# Patient Record
Sex: MALE | Race: WHITE | NOT HISPANIC OR LATINO | Employment: FULL TIME | URBAN - METROPOLITAN AREA
[De-identification: names, ages, dates, MRNs, and addresses within clinical notes are randomized per-mention and may not be internally consistent; named-entity substitution may affect disease eponyms.]

---

## 2019-07-05 ENCOUNTER — APPOINTMENT (EMERGENCY)
Dept: RADIOLOGY | Facility: HOSPITAL | Age: 44
End: 2019-07-05
Payer: COMMERCIAL

## 2019-07-05 ENCOUNTER — HOSPITAL ENCOUNTER (EMERGENCY)
Facility: HOSPITAL | Age: 44
End: 2019-07-06
Attending: EMERGENCY MEDICINE | Admitting: EMERGENCY MEDICINE
Payer: COMMERCIAL

## 2019-07-05 DIAGNOSIS — S91.302A AVULSION OF SKIN OF LEFT FOOT, INITIAL ENCOUNTER: Primary | ICD-10-CM

## 2019-07-05 DIAGNOSIS — S91.301A AVULSION OF SKIN OF RIGHT FOOT, INITIAL ENCOUNTER: ICD-10-CM

## 2019-07-05 DIAGNOSIS — S90.851A FOREIGN BODY IN RIGHT FOOT, INITIAL ENCOUNTER: ICD-10-CM

## 2019-07-05 PROCEDURE — 90471 IMMUNIZATION ADMIN: CPT

## 2019-07-05 PROCEDURE — 99284 EMERGENCY DEPT VISIT MOD MDM: CPT

## 2019-07-05 PROCEDURE — 99284 EMERGENCY DEPT VISIT MOD MDM: CPT | Performed by: PHYSICIAN ASSISTANT

## 2019-07-05 PROCEDURE — 73630 X-RAY EXAM OF FOOT: CPT

## 2019-07-05 RX ORDER — ACETAMINOPHEN 325 MG/1
975 TABLET ORAL ONCE
Status: COMPLETED | OUTPATIENT
Start: 2019-07-05 | End: 2019-07-05

## 2019-07-05 RX ORDER — CEFAZOLIN SODIUM 2 G/50ML
2000 SOLUTION INTRAVENOUS ONCE
Status: COMPLETED | OUTPATIENT
Start: 2019-07-05 | End: 2019-07-06

## 2019-07-05 RX ORDER — OXYCODONE HYDROCHLORIDE AND ACETAMINOPHEN 5; 325 MG/1; MG/1
1 TABLET ORAL ONCE
Status: DISCONTINUED | OUTPATIENT
Start: 2019-07-05 | End: 2019-07-05

## 2019-07-05 RX ADMIN — ACETAMINOPHEN 975 MG: 325 TABLET, FILM COATED ORAL at 22:37

## 2019-07-06 ENCOUNTER — HOSPITAL ENCOUNTER (INPATIENT)
Facility: HOSPITAL | Age: 44
LOS: 2 days | Discharge: HOME HEALTH | DRG: 902 | End: 2019-07-08
Attending: PODIATRIST | Admitting: PODIATRIST
Payer: COMMERCIAL

## 2019-07-06 ENCOUNTER — APPOINTMENT (INPATIENT)
Dept: RADIOLOGY | Facility: HOSPITAL | Age: 44
DRG: 902 | End: 2019-07-06
Payer: COMMERCIAL

## 2019-07-06 ENCOUNTER — ANESTHESIA (INPATIENT)
Dept: PERIOP | Facility: HOSPITAL | Age: 44
DRG: 902 | End: 2019-07-06
Payer: COMMERCIAL

## 2019-07-06 ENCOUNTER — ANESTHESIA EVENT (INPATIENT)
Dept: PERIOP | Facility: HOSPITAL | Age: 44
DRG: 902 | End: 2019-07-06
Payer: COMMERCIAL

## 2019-07-06 VITALS
HEART RATE: 90 BPM | RESPIRATION RATE: 18 BRPM | DIASTOLIC BLOOD PRESSURE: 60 MMHG | SYSTOLIC BLOOD PRESSURE: 150 MMHG | TEMPERATURE: 98 F | OXYGEN SATURATION: 97 % | HEIGHT: 75 IN

## 2019-07-06 DIAGNOSIS — S99.929A: Primary | ICD-10-CM

## 2019-07-06 DIAGNOSIS — S99.922D: ICD-10-CM

## 2019-07-06 DIAGNOSIS — S99.921A SOFT TISSUE INJURY OF RIGHT FOOT, INITIAL ENCOUNTER: ICD-10-CM

## 2019-07-06 DIAGNOSIS — S99.922A SOFT TISSUE INJURY OF LEFT FOOT, INITIAL ENCOUNTER: ICD-10-CM

## 2019-07-06 DIAGNOSIS — N17.9 AKI (ACUTE KIDNEY INJURY) (HCC): ICD-10-CM

## 2019-07-06 PROBLEM — F32.A DEPRESSION: Status: ACTIVE | Noted: 2019-07-06

## 2019-07-06 LAB
ALBUMIN SERPL BCP-MCNC: 4.1 G/DL (ref 3.5–5)
ALP SERPL-CCNC: 109 U/L (ref 46–116)
ALT SERPL W P-5'-P-CCNC: 29 U/L (ref 12–78)
ANION GAP SERPL CALCULATED.3IONS-SCNC: 10 MMOL/L (ref 4–13)
ANION GAP SERPL CALCULATED.3IONS-SCNC: 6 MMOL/L (ref 4–13)
AST SERPL W P-5'-P-CCNC: 21 U/L (ref 5–45)
BASOPHILS # BLD AUTO: 0.03 THOUSANDS/ΜL (ref 0–0.1)
BASOPHILS # BLD AUTO: 0.04 THOUSANDS/ΜL (ref 0–0.1)
BASOPHILS NFR BLD AUTO: 0 % (ref 0–1)
BASOPHILS NFR BLD AUTO: 0 % (ref 0–1)
BILIRUB SERPL-MCNC: 0.4 MG/DL (ref 0.2–1)
BUN SERPL-MCNC: 25 MG/DL (ref 5–25)
BUN SERPL-MCNC: 29 MG/DL (ref 5–25)
CALCIUM SERPL-MCNC: 8.5 MG/DL (ref 8.3–10.1)
CALCIUM SERPL-MCNC: 8.9 MG/DL (ref 8.3–10.1)
CHLORIDE SERPL-SCNC: 102 MMOL/L (ref 100–108)
CHLORIDE SERPL-SCNC: 106 MMOL/L (ref 100–108)
CO2 SERPL-SCNC: 26 MMOL/L (ref 21–32)
CO2 SERPL-SCNC: 28 MMOL/L (ref 21–32)
CREAT SERPL-MCNC: 1.26 MG/DL (ref 0.6–1.3)
CREAT SERPL-MCNC: 1.49 MG/DL (ref 0.6–1.3)
EOSINOPHIL # BLD AUTO: 0.03 THOUSAND/ΜL (ref 0–0.61)
EOSINOPHIL # BLD AUTO: 0.06 THOUSAND/ΜL (ref 0–0.61)
EOSINOPHIL NFR BLD AUTO: 0 % (ref 0–6)
EOSINOPHIL NFR BLD AUTO: 1 % (ref 0–6)
ERYTHROCYTE [DISTWIDTH] IN BLOOD BY AUTOMATED COUNT: 12.5 % (ref 11.6–15.1)
ERYTHROCYTE [DISTWIDTH] IN BLOOD BY AUTOMATED COUNT: 12.5 % (ref 11.6–15.1)
GFR SERPL CREATININE-BSD FRML MDRD: 57 ML/MIN/1.73SQ M
GFR SERPL CREATININE-BSD FRML MDRD: 70 ML/MIN/1.73SQ M
GLUCOSE SERPL-MCNC: 102 MG/DL (ref 65–140)
GLUCOSE SERPL-MCNC: 141 MG/DL (ref 65–140)
HCT VFR BLD AUTO: 43.4 % (ref 36.5–49.3)
HCT VFR BLD AUTO: 47.2 % (ref 36.5–49.3)
HGB BLD-MCNC: 14.7 G/DL (ref 12–17)
HGB BLD-MCNC: 16.3 G/DL (ref 12–17)
IMM GRANULOCYTES # BLD AUTO: 0.03 THOUSAND/UL (ref 0–0.2)
IMM GRANULOCYTES # BLD AUTO: 0.06 THOUSAND/UL (ref 0–0.2)
IMM GRANULOCYTES NFR BLD AUTO: 0 % (ref 0–2)
IMM GRANULOCYTES NFR BLD AUTO: 1 % (ref 0–2)
LYMPHOCYTES # BLD AUTO: 0.96 THOUSANDS/ΜL (ref 0.6–4.47)
LYMPHOCYTES # BLD AUTO: 1.53 THOUSANDS/ΜL (ref 0.6–4.47)
LYMPHOCYTES NFR BLD AUTO: 16 % (ref 14–44)
LYMPHOCYTES NFR BLD AUTO: 8 % (ref 14–44)
MCH RBC QN AUTO: 31 PG (ref 26.8–34.3)
MCH RBC QN AUTO: 31.3 PG (ref 26.8–34.3)
MCHC RBC AUTO-ENTMCNC: 33.9 G/DL (ref 31.4–37.4)
MCHC RBC AUTO-ENTMCNC: 34.5 G/DL (ref 31.4–37.4)
MCV RBC AUTO: 91 FL (ref 82–98)
MCV RBC AUTO: 92 FL (ref 82–98)
MONOCYTES # BLD AUTO: 0.46 THOUSAND/ΜL (ref 0.17–1.22)
MONOCYTES # BLD AUTO: 0.75 THOUSAND/ΜL (ref 0.17–1.22)
MONOCYTES NFR BLD AUTO: 4 % (ref 4–12)
MONOCYTES NFR BLD AUTO: 8 % (ref 4–12)
NEUTROPHILS # BLD AUTO: 10.05 THOUSANDS/ΜL (ref 1.85–7.62)
NEUTROPHILS # BLD AUTO: 6.94 THOUSANDS/ΜL (ref 1.85–7.62)
NEUTS SEG NFR BLD AUTO: 75 % (ref 43–75)
NEUTS SEG NFR BLD AUTO: 87 % (ref 43–75)
NRBC BLD AUTO-RTO: 0 /100 WBCS
NRBC BLD AUTO-RTO: 0 /100 WBCS
PLATELET # BLD AUTO: 206 THOUSANDS/UL (ref 149–390)
PLATELET # BLD AUTO: 212 THOUSANDS/UL (ref 149–390)
PLATELET # BLD AUTO: 233 THOUSANDS/UL (ref 149–390)
PMV BLD AUTO: 10.4 FL (ref 8.9–12.7)
PMV BLD AUTO: 9.6 FL (ref 8.9–12.7)
PMV BLD AUTO: 9.9 FL (ref 8.9–12.7)
POTASSIUM SERPL-SCNC: 3.8 MMOL/L (ref 3.5–5.3)
POTASSIUM SERPL-SCNC: 4.1 MMOL/L (ref 3.5–5.3)
PROT SERPL-MCNC: 7.3 G/DL (ref 6.4–8.2)
RBC # BLD AUTO: 4.74 MILLION/UL (ref 3.88–5.62)
RBC # BLD AUTO: 5.21 MILLION/UL (ref 3.88–5.62)
SODIUM SERPL-SCNC: 138 MMOL/L (ref 136–145)
SODIUM SERPL-SCNC: 140 MMOL/L (ref 136–145)
WBC # BLD AUTO: 11.59 THOUSAND/UL (ref 4.31–10.16)
WBC # BLD AUTO: 9.35 THOUSAND/UL (ref 4.31–10.16)

## 2019-07-06 PROCEDURE — 85025 COMPLETE CBC W/AUTO DIFF WBC: CPT | Performed by: STUDENT IN AN ORGANIZED HEALTH CARE EDUCATION/TRAINING PROGRAM

## 2019-07-06 PROCEDURE — 15276 SKIN SUB GRAFT F/N/HF/G ADDL: CPT | Performed by: PODIATRIST

## 2019-07-06 PROCEDURE — 15004 WOUND PREP F/N/HF/G: CPT | Performed by: PODIATRIST

## 2019-07-06 PROCEDURE — 36415 COLL VENOUS BLD VENIPUNCTURE: CPT | Performed by: PHYSICIAN ASSISTANT

## 2019-07-06 PROCEDURE — 15005 WND PREP F/N/HF/G ADDL CM: CPT | Performed by: PODIATRIST

## 2019-07-06 PROCEDURE — 0HRMXJZ REPLACEMENT OF RIGHT FOOT SKIN WITH SYNTHETIC SUBSTITUTE, EXTERNAL APPROACH: ICD-10-PCS | Performed by: PODIATRIST

## 2019-07-06 PROCEDURE — 99253 IP/OBS CNSLTJ NEW/EST LOW 45: CPT | Performed by: FAMILY MEDICINE

## 2019-07-06 PROCEDURE — 80053 COMPREHEN METABOLIC PANEL: CPT | Performed by: PHYSICIAN ASSISTANT

## 2019-07-06 PROCEDURE — 85025 COMPLETE CBC W/AUTO DIFF WBC: CPT | Performed by: PHYSICIAN ASSISTANT

## 2019-07-06 PROCEDURE — 15275 SKIN SUB GRAFT FACE/NK/HF/G: CPT | Performed by: PODIATRIST

## 2019-07-06 PROCEDURE — 73620 X-RAY EXAM OF FOOT: CPT

## 2019-07-06 PROCEDURE — 0HRNXJZ REPLACEMENT OF LEFT FOOT SKIN WITH SYNTHETIC SUBSTITUTE, EXTERNAL APPROACH: ICD-10-PCS | Performed by: PODIATRIST

## 2019-07-06 PROCEDURE — 85049 AUTOMATED PLATELET COUNT: CPT | Performed by: STUDENT IN AN ORGANIZED HEALTH CARE EDUCATION/TRAINING PROGRAM

## 2019-07-06 PROCEDURE — 90715 TDAP VACCINE 7 YRS/> IM: CPT | Performed by: PHYSICIAN ASSISTANT

## 2019-07-06 PROCEDURE — 96365 THER/PROPH/DIAG IV INF INIT: CPT

## 2019-07-06 PROCEDURE — 0JBR0ZZ EXCISION OF LEFT FOOT SUBCUTANEOUS TISSUE AND FASCIA, OPEN APPROACH: ICD-10-PCS | Performed by: PODIATRIST

## 2019-07-06 PROCEDURE — 99222 1ST HOSP IP/OBS MODERATE 55: CPT | Performed by: PODIATRIST

## 2019-07-06 PROCEDURE — 80048 BASIC METABOLIC PNL TOTAL CA: CPT | Performed by: STUDENT IN AN ORGANIZED HEALTH CARE EDUCATION/TRAINING PROGRAM

## 2019-07-06 PROCEDURE — 0JBQ0ZZ EXCISION OF RIGHT FOOT SUBCUTANEOUS TISSUE AND FASCIA, OPEN APPROACH: ICD-10-PCS | Performed by: PODIATRIST

## 2019-07-06 DEVICE — INTEGRA® BILAYER MATRIX WOUND DRESSING 2 IN*2 IN (5 CM*5 CM)
Type: IMPLANTABLE DEVICE | Site: HEEL | Status: FUNCTIONAL
Brand: INTEGRA®

## 2019-07-06 DEVICE — INTEGRA® BILAYER MATRIX WOUND DRESSING 4 IN*5 IN (10 CM*12.5 CM)
Type: IMPLANTABLE DEVICE | Site: HEEL | Status: FUNCTIONAL
Brand: INTEGRA®

## 2019-07-06 RX ORDER — ONDANSETRON 2 MG/ML
INJECTION INTRAMUSCULAR; INTRAVENOUS AS NEEDED
Status: DISCONTINUED | OUTPATIENT
Start: 2019-07-06 | End: 2019-07-06 | Stop reason: SURG

## 2019-07-06 RX ORDER — OXYCODONE HYDROCHLORIDE AND ACETAMINOPHEN 5; 325 MG/1; MG/1
1 TABLET ORAL EVERY 4 HOURS PRN
Status: DISCONTINUED | OUTPATIENT
Start: 2019-07-06 | End: 2019-07-08 | Stop reason: HOSPADM

## 2019-07-06 RX ORDER — LIDOCAINE HYDROCHLORIDE 10 MG/ML
INJECTION, SOLUTION EPIDURAL; INFILTRATION; INTRACAUDAL; PERINEURAL AS NEEDED
Status: DISCONTINUED | OUTPATIENT
Start: 2019-07-06 | End: 2019-07-06 | Stop reason: HOSPADM

## 2019-07-06 RX ORDER — EPHEDRINE SULFATE 50 MG/ML
INJECTION INTRAVENOUS AS NEEDED
Status: DISCONTINUED | OUTPATIENT
Start: 2019-07-06 | End: 2019-07-06 | Stop reason: SURG

## 2019-07-06 RX ORDER — MIDAZOLAM HYDROCHLORIDE 1 MG/ML
INJECTION INTRAMUSCULAR; INTRAVENOUS AS NEEDED
Status: DISCONTINUED | OUTPATIENT
Start: 2019-07-06 | End: 2019-07-06 | Stop reason: SURG

## 2019-07-06 RX ORDER — MAGNESIUM HYDROXIDE 1200 MG/15ML
LIQUID ORAL AS NEEDED
Status: DISCONTINUED | OUTPATIENT
Start: 2019-07-06 | End: 2019-07-06 | Stop reason: HOSPADM

## 2019-07-06 RX ORDER — LIDOCAINE HYDROCHLORIDE 10 MG/ML
INJECTION, SOLUTION INFILTRATION; PERINEURAL AS NEEDED
Status: DISCONTINUED | OUTPATIENT
Start: 2019-07-06 | End: 2019-07-06 | Stop reason: SURG

## 2019-07-06 RX ORDER — KETOROLAC TROMETHAMINE 30 MG/ML
INJECTION, SOLUTION INTRAMUSCULAR; INTRAVENOUS AS NEEDED
Status: DISCONTINUED | OUTPATIENT
Start: 2019-07-06 | End: 2019-07-06 | Stop reason: SURG

## 2019-07-06 RX ORDER — HEPARIN SODIUM 5000 [USP'U]/ML
5000 INJECTION, SOLUTION INTRAVENOUS; SUBCUTANEOUS EVERY 8 HOURS SCHEDULED
Status: DISCONTINUED | OUTPATIENT
Start: 2019-07-06 | End: 2019-07-08 | Stop reason: HOSPADM

## 2019-07-06 RX ORDER — PROPOFOL 10 MG/ML
INJECTION, EMULSION INTRAVENOUS AS NEEDED
Status: DISCONTINUED | OUTPATIENT
Start: 2019-07-06 | End: 2019-07-06 | Stop reason: SURG

## 2019-07-06 RX ORDER — DEXAMETHASONE SODIUM PHOSPHATE 10 MG/ML
INJECTION, SOLUTION INTRAMUSCULAR; INTRAVENOUS AS NEEDED
Status: DISCONTINUED | OUTPATIENT
Start: 2019-07-06 | End: 2019-07-06 | Stop reason: SURG

## 2019-07-06 RX ORDER — MIDAZOLAM HYDROCHLORIDE 1 MG/ML
INJECTION INTRAMUSCULAR; INTRAVENOUS
Status: COMPLETED
Start: 2019-07-06 | End: 2019-07-06

## 2019-07-06 RX ORDER — BUPIVACAINE HYDROCHLORIDE 2.5 MG/ML
INJECTION, SOLUTION INFILTRATION; PERINEURAL AS NEEDED
Status: DISCONTINUED | OUTPATIENT
Start: 2019-07-06 | End: 2019-07-06 | Stop reason: HOSPADM

## 2019-07-06 RX ORDER — ACETAMINOPHEN 325 MG/1
650 TABLET ORAL EVERY 6 HOURS PRN
Status: DISCONTINUED | OUTPATIENT
Start: 2019-07-06 | End: 2019-07-08 | Stop reason: HOSPADM

## 2019-07-06 RX ORDER — SODIUM CHLORIDE, SODIUM LACTATE, POTASSIUM CHLORIDE, CALCIUM CHLORIDE 600; 310; 30; 20 MG/100ML; MG/100ML; MG/100ML; MG/100ML
50 INJECTION, SOLUTION INTRAVENOUS CONTINUOUS
Status: DISCONTINUED | OUTPATIENT
Start: 2019-07-06 | End: 2019-07-06

## 2019-07-06 RX ORDER — OXYCODONE HYDROCHLORIDE AND ACETAMINOPHEN 5; 325 MG/1; MG/1
2 TABLET ORAL EVERY 4 HOURS PRN
Status: DISCONTINUED | OUTPATIENT
Start: 2019-07-06 | End: 2019-07-08 | Stop reason: HOSPADM

## 2019-07-06 RX ORDER — FENTANYL CITRATE 50 UG/ML
INJECTION, SOLUTION INTRAMUSCULAR; INTRAVENOUS
Status: COMPLETED
Start: 2019-07-06 | End: 2019-07-06

## 2019-07-06 RX ORDER — FENTANYL CITRATE/PF 50 MCG/ML
25 SYRINGE (ML) INJECTION
Status: DISCONTINUED | OUTPATIENT
Start: 2019-07-06 | End: 2019-07-06 | Stop reason: HOSPADM

## 2019-07-06 RX ORDER — CEFAZOLIN SODIUM 1 G/50ML
1000 SOLUTION INTRAVENOUS EVERY 8 HOURS
Status: DISCONTINUED | OUTPATIENT
Start: 2019-07-06 | End: 2019-07-08 | Stop reason: HOSPADM

## 2019-07-06 RX ORDER — ONDANSETRON 2 MG/ML
4 INJECTION INTRAMUSCULAR; INTRAVENOUS ONCE AS NEEDED
Status: DISCONTINUED | OUTPATIENT
Start: 2019-07-06 | End: 2019-07-06 | Stop reason: HOSPADM

## 2019-07-06 RX ORDER — FENTANYL CITRATE 50 UG/ML
INJECTION, SOLUTION INTRAMUSCULAR; INTRAVENOUS AS NEEDED
Status: DISCONTINUED | OUTPATIENT
Start: 2019-07-06 | End: 2019-07-06 | Stop reason: SURG

## 2019-07-06 RX ADMIN — DEXAMETHASONE SODIUM PHOSPHATE 10 MG: 10 INJECTION, SOLUTION INTRAMUSCULAR; INTRAVENOUS at 10:23

## 2019-07-06 RX ADMIN — LIDOCAINE HYDROCHLORIDE 100 MG: 10 INJECTION, SOLUTION INFILTRATION; PERINEURAL at 10:04

## 2019-07-06 RX ADMIN — EPHEDRINE SULFATE 10 MG: 50 INJECTION, SOLUTION INTRAVENOUS at 10:23

## 2019-07-06 RX ADMIN — SODIUM CHLORIDE, SODIUM LACTATE, POTASSIUM CHLORIDE, AND CALCIUM CHLORIDE: .6; .31; .03; .02 INJECTION, SOLUTION INTRAVENOUS at 09:58

## 2019-07-06 RX ADMIN — CEFAZOLIN SODIUM 1000 MG: 1 SOLUTION INTRAVENOUS at 16:24

## 2019-07-06 RX ADMIN — FENTANYL CITRATE 50 MCG: 50 INJECTION, SOLUTION INTRAMUSCULAR; INTRAVENOUS at 10:10

## 2019-07-06 RX ADMIN — PROPOFOL 100 MG: 10 INJECTION, EMULSION INTRAVENOUS at 10:10

## 2019-07-06 RX ADMIN — CEFAZOLIN SODIUM 1000 MG: 1 SOLUTION INTRAVENOUS at 08:02

## 2019-07-06 RX ADMIN — CEFAZOLIN SODIUM 3000 MG: 1 SOLUTION INTRAVENOUS at 10:58

## 2019-07-06 RX ADMIN — SODIUM CHLORIDE, SODIUM LACTATE, POTASSIUM CHLORIDE, AND CALCIUM CHLORIDE: .6; .31; .03; .02 INJECTION, SOLUTION INTRAVENOUS at 10:55

## 2019-07-06 RX ADMIN — FENTANYL CITRATE 25 MCG: 50 INJECTION, SOLUTION INTRAMUSCULAR; INTRAVENOUS at 12:31

## 2019-07-06 RX ADMIN — METRONIDAZOLE 500 MG: 500 INJECTION, SOLUTION INTRAVENOUS at 04:30

## 2019-07-06 RX ADMIN — KETOROLAC TROMETHAMINE 30 MG: 30 INJECTION, SOLUTION INTRAMUSCULAR at 10:23

## 2019-07-06 RX ADMIN — FENTANYL CITRATE 100 MCG: 50 INJECTION, SOLUTION INTRAMUSCULAR; INTRAVENOUS at 10:05

## 2019-07-06 RX ADMIN — METRONIDAZOLE 500 MG: 500 INJECTION, SOLUTION INTRAVENOUS at 10:02

## 2019-07-06 RX ADMIN — FENTANYL CITRATE 25 MCG: 50 INJECTION, SOLUTION INTRAMUSCULAR; INTRAVENOUS at 12:52

## 2019-07-06 RX ADMIN — SERTRALINE HYDROCHLORIDE 50 MG: 50 TABLET ORAL at 08:02

## 2019-07-06 RX ADMIN — TETANUS TOXOID, REDUCED DIPHTHERIA TOXOID AND ACELLULAR PERTUSSIS VACCINE, ADSORBED 0.5 ML: 5; 2.5; 8; 8; 2.5 SUSPENSION INTRAMUSCULAR at 00:40

## 2019-07-06 RX ADMIN — HEPARIN SODIUM 5000 UNITS: 5000 INJECTION INTRAVENOUS; SUBCUTANEOUS at 21:47

## 2019-07-06 RX ADMIN — MIDAZOLAM 2 MG: 1 INJECTION INTRAMUSCULAR; INTRAVENOUS at 09:56

## 2019-07-06 RX ADMIN — FENTANYL CITRATE 25 MCG: 50 INJECTION, SOLUTION INTRAMUSCULAR; INTRAVENOUS at 12:09

## 2019-07-06 RX ADMIN — ONDANSETRON 4 MG: 2 INJECTION INTRAMUSCULAR; INTRAVENOUS at 10:23

## 2019-07-06 RX ADMIN — FENTANYL CITRATE 25 MCG: 50 INJECTION, SOLUTION INTRAMUSCULAR; INTRAVENOUS at 11:59

## 2019-07-06 RX ADMIN — CEFAZOLIN SODIUM 2000 MG: 2 SOLUTION INTRAVENOUS at 00:36

## 2019-07-06 RX ADMIN — SODIUM CHLORIDE, SODIUM LACTATE, POTASSIUM CHLORIDE, AND CALCIUM CHLORIDE 50 ML/HR: .6; .31; .03; .02 INJECTION, SOLUTION INTRAVENOUS at 02:37

## 2019-07-06 RX ADMIN — FENTANYL CITRATE 25 MCG: 50 INJECTION, SOLUTION INTRAMUSCULAR; INTRAVENOUS at 12:39

## 2019-07-06 RX ADMIN — ACETAMINOPHEN 650 MG: 325 TABLET ORAL at 04:26

## 2019-07-06 RX ADMIN — OXYCODONE HYDROCHLORIDE AND ACETAMINOPHEN 2 TABLET: 5; 325 TABLET ORAL at 20:37

## 2019-07-06 RX ADMIN — OXYCODONE HYDROCHLORIDE AND ACETAMINOPHEN 1 TABLET: 5; 325 TABLET ORAL at 08:01

## 2019-07-06 RX ADMIN — METRONIDAZOLE 500 MG: 500 INJECTION, SOLUTION INTRAVENOUS at 20:39

## 2019-07-06 RX ADMIN — SODIUM CHLORIDE, SODIUM LACTATE, POTASSIUM CHLORIDE, AND CALCIUM CHLORIDE 50 ML/HR: .6; .31; .03; .02 INJECTION, SOLUTION INTRAVENOUS at 12:16

## 2019-07-06 RX ADMIN — FENTANYL CITRATE 25 MCG: 50 INJECTION, SOLUTION INTRAMUSCULAR; INTRAVENOUS at 12:21

## 2019-07-06 RX ADMIN — OXYCODONE HYDROCHLORIDE AND ACETAMINOPHEN 1 TABLET: 5; 325 TABLET ORAL at 16:29

## 2019-07-06 RX ADMIN — PROPOFOL 200 MG: 10 INJECTION, EMULSION INTRAVENOUS at 10:04
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
## 2019-07-06 NOTE — ANESTHESIA POSTPROCEDURE EVALUATION
Post-Op Assessment Note    CV Status:  Stable  Pain Score: 0    Pain management: adequate     Mental Status:  Sleepy and somnolent   Hydration Status:  Euvolemic and stable   PONV Controlled:  None   Airway Patency:  Patent   Post Op Vitals Reviewed: Yes      Staff: CRNA           /59 (07/06/19 1114)    Temp (!) 96 9 °F (36 1 °C) (07/06/19 1114)    Pulse 78 (07/06/19 1114)   Resp 14 (07/06/19 1114)    SpO2 99 % (07/06/19 1114)

## 2019-07-06 NOTE — OP NOTE
OPERATIVE REPORT - Podiatry  PATIENT NAME: Megan Aguilera    :  1975  MRN: [id number]  Pt Location: [facility] OR ROOM 04    SURGERY DATE: 2019    Surgeon(s) and Role:     * Daphne Suazo DPM - Primary     * Cody Madsen DPM - Assisting    Pre-op Diagnosis:  Soft tissue injury of left foot, initial encounter [S99 922A]  Soft tissue injury of right foot, initial encounter [D40 679I]    Post-Op Diagnosis Codes: * Soft tissue injury of left foot, initial encounter [O01 359X]     * Soft tissue injury of right foot, initial encounter [J43 351H]    Procedure(s) (LRB):  1  Bilateral debridement of foot/toe (washout) with surgical preparation with creation of recipient site by excision of open wounds, burn eschar, or scar (including subcutaneous tissue), or incisional release of scar contracture on PE and/or multiple digits; first 100 sqcm (CPT: 66078), each additional 100 sq cm (CPT: 09840)  2  APPLICATION OF SKIN GRAFT SUBSTITUTE BILATERALLY (CPT: 02035, 38188)    Specimen(s):  * No specimens in log *    Estimated Blood Loss:   0 mL    Drains:  * No LDAs found *    Anesthesia Type:   Choice with 20 ml of 1% Lidocaine and 0 25% Bupivacaine in a 1:1 mixture    Hemostasis:  Anatomical dissection    Materials:  Surgicell  Skin staples  1x Integra skin graft substitute 4 x 5 cm, 1x Integra skin graft substitute 2 x 2 cm       Operative Findings:  Consistent with diagnosis, medial hallux wound on left foot went down to periosteum  Complications:   None    Procedure and Technique:     Under mild sedation, the patient was brought into the operating room on his hospital bed in the supine position  A time out was performed to confirm the correct patient, procedure and site with all parties in agreement  Following IV sedation, a V block with block was performed bilateral consisting of 20 ml of 1% Lidocaine and 0 25% Bupivacaine in a 1:1 mixture   The foot was then scrubbed, prepped and draped in the usual aseptic manner  Attention was then directed to the right foot where wounds are present on the medial aspect of the 1st MPJ and medial aspect of heel  Both wounds were excisionally debrided using scalpel to trim and remove devitalized nonviable tissue down to subcutaneous tissue  The medial wound at the 1st MPJ measured 7 5cm x 4 5 cm measured, and the heel wound measured 10 cm x 4 cm, a total of 73 75 sq cm was debrided  Pulse lavage was used to copiously irrigate the wounds  Neurosurgeon attention was directed to the left foot where wounds or are present on the medial aspect of the 1st MPJ and medial aspect of the heel  Both wounds were excisionally debrided using scalpel to trim and remove devitalized nonviable tissue  First MPJ wound was debrided down to periosteum and the heel wound was debrided down to subcutaneous tissue  The medial wound at the 1st MPJ measures 9 cm x 4 cm, and the heel wound measured 5 5 cm x 2 cm  Total square cm was 47 square cm  Pulse lavage was used to copiously irrigate the wounds  2x Integra skin graft substitute (2 x 2 cm , 4 x 5 cm) was applied to all 4 wounds bilaterally and attached using skin staples  The foot was then cleansed and dried  The wounds were dressed with Surgicel, Xeroform, 4x4s, Kerlix, Ace wrap  The patient tolerated the procedure and anesthesia well and was transported to the PACU with vital signs stable  Dr Bañuelos was present during the entire procedure and participated in all key aspects  SIGNATURE: Hailey Rendon DPM  DATE: July 6, 2019  TIME: 11:29 AM      Portions of the record may have been created with voice recognition software  Occasional wrong word or "sound a like" substitutions may have occurred due to the inherent limitations of voice recognition software  Read the chart carefully and recognize, using context, where substitutions have occurred

## 2019-07-06 NOTE — ED NOTES
Transfer information     time 0045  SLB ms4- room 467  Dr Armen Arnett  # [phone]     Fátima Naidu RN  07/06/19 202
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
## 2019-07-06 NOTE — PLAN OF CARE
Problem: Potential for Falls  Goal: Patient will remain free of falls  Description  INTERVENTIONS:  - Assess patient frequently for physical needs  -  Identify cognitive and physical deficits and behaviors that affect risk of falls    -  [place] fall precautions as indicated by assessment   - Educate patient/family on patient safety including physical limitations  - Instruct patient to call for assistance with activity based on assessment  - Modify environment to reduce risk of injury  - Consider OT/PT consult to assist with strengthening/mobility  Outcome: Progressing     Problem: PAIN - ADULT  Goal: Verbalizes/displays adequate comfort level or baseline comfort level  Description  Interventions:  - Encourage patient to monitor pain and request assistance  - Assess pain using appropriate pain scale  - Administer analgesics based on type and severity of pain and evaluate response  - Implement non-pharmacological measures as appropriate and evaluate response  - Consider cultural and social influences on pain and pain management  - Notify physician/advanced practitioner if interventions unsuccessful or patient reports new pain  Outcome: Progressing     Problem: INFECTION - ADULT  Goal: Absence or prevention of progression during hospitalization  Description  INTERVENTIONS:  - Assess and monitor for signs and symptoms of infection  - Monitor lab/diagnostic results  - Monitor all insertion sites, i e  indwelling lines, tubes, and drains  - Monitor endotracheal (as able) and nasal secretions for changes in amount and color  - [place] appropriate cooling/warming therapies per order  - Administer medications as ordered  - Instruct and encourage patient and family to use good hand hygiene technique  - Identify and instruct in appropriate isolation precautions for identified infection/condition  Outcome: Progressing  Goal: Absence of fever/infection during neutropenic period  Description  INTERVENTIONS:  - Monitor WBC  - Implement neutropenic guidelines  Outcome: Progressing     Problem: SAFETY ADULT  Goal: Patient will remain free of falls  Description  INTERVENTIONS:  - Assess patient frequently for physical needs  -  Identify cognitive and physical deficits and behaviors that affect risk of falls    -  [place] fall precautions as indicated by assessment   - Educate patient/family on patient safety including physical limitations  - Instruct patient to call for assistance with activity based on assessment  - Modify environment to reduce risk of injury  - Consider OT/PT consult to assist with strengthening/mobility  Outcome: Progressing  Goal: Maintain or return to baseline ADL function  Description  INTERVENTIONS:  -  Assess patient's ability to carry out ADLs; assess patient's baseline for ADL function and identify physical deficits which impact ability to perform ADLs (bathing, care of mouth/teeth, toileting, grooming, dressing, etc )  - Assess/evaluate cause of self-care deficits   - Assess range of motion  - Assess patient's mobility; develop plan if impaired  - Assess patient's need for assistive devices and provide as appropriate  - Encourage maximum independence but intervene and supervise when necessary  ¯ Involve family in performance of ADLs  ¯ Assess for home care needs following discharge   ¯ Request OT consult to assist with ADL evaluation and planning for discharge  ¯ Provide patient education as appropriate  Outcome: Progressing  Goal: Maintain or return mobility status to optimal level  Description  INTERVENTIONS:  - Assess patient's baseline mobility status (ambulation, transfers, stairs, etc )    - Identify cognitive and physical deficits and behaviors that affect mobility  - Identify mobility aids required to assist with transfers and/or ambulation (gait belt, sit-to-stand, lift, walker, cane, etc )  - [place] fall precautions as indicated by assessment  - Record patient progress and toleration of activity level on Mobility SBAR; progress patient to next Phase/Stage  - Instruct patient to call for assistance with activity based on assessment  - Request Rehabilitation consult to assist with strengthening/weightbearing, etc   Outcome: Progressing     Problem: DISCHARGE PLANNING  Goal: Discharge to home or other facility with appropriate resources  Description  INTERVENTIONS:  - Identify barriers to discharge w/patient and caregiver  - Arrange for needed discharge resources and transportation as appropriate  - Identify discharge learning needs (meds, wound care, etc )  - Arrange for interpretive services to assist at discharge as needed  - Refer to Case Management Department for coordinating discharge planning if the patient needs post-hospital services based on physician/advanced practitioner order or complex needs related to functional status, cognitive ability, or social support system  Outcome: Progressing     Problem: Knowledge Deficit  Goal: Patient/family/caregiver demonstrates understanding of disease process, treatment plan, medications, and discharge instructions  Description  Complete learning assessment and assess knowledge base    Interventions:  - Provide teaching at level of understanding  - Provide teaching via preferred learning methods  Outcome: Progressing

## 2019-07-06 NOTE — EMTALA/ACUTE CARE TRANSFER
[address] [place] [address]  Dept: [phone]      EMTALA TRANSFER CONSENT    NAME Yaron Barrios                                         1975                              MRN [id number]    I have been informed of my rights regarding examination, treatment, and transfer   by Dr Webber att  providers found    Benefits: Specialized equipment and/or services available at the receiving facility (Include comment)________________________    Risks: Potential for delay in receiving treatment, Potential deterioration of medical condition, Loss of IV, Increased discomfort during transfer, Possible worsening of condition or death during transfer      Consent for Transfer:  I acknowledge that my medical condition has been evaluated and explained to me by the emergency department physician or other qualified medical person and/or my attending physician, who has recommended that I be transferred to the service of  Accepting Physician: Loli Burden at [facility] Name, [address] : [place]  The above potential benefits of such transfer, the potential risks associated with such transfer, and the probable risks of not being transferred have been explained to me, and I fully understand them  The doctor has explained that, in my case, the benefits of transfer outweigh the risks  I agree to be transferred  I authorize the performance of emergency medical procedures and treatments upon me in both transit and upon arrival at the receiving facility  Additionally, I authorize the release of any and all medical records to the receiving facility and request they be transported with me, if possible  I understand that the safest mode of transportation during a medical emergency is an ambulance and that the Hospital advocates the use of this mode of transport   Risks of traveling to the receiving facility by car, including absence of medical control, life sustaining equipment, such as oxygen, and medical personnel has been explained to me and I fully understand them  (HAWK CORRECT BOX BELOW)  [  ]  I consent to the stated transfer and to be transported by ambulance/helicopter  [  ]  I consent to the stated transfer, but refuse transportation by ambulance and accept full responsibility for my transportation by car  I understand the risks of non-ambulance transfers and I exonerate the Hospital and its staff from any deterioration in my condition that results from this refusal     X___________________________________________    DATE  19  TIME________  Signature of patient or legally responsible individual signing on patient behalf           RELATIONSHIP TO PATIENT_________________________          Provider Certification    NAME Gerardo Monique                                         1975                              MRN [id number]    A medical screening exam was performed on the above named patient  Based on the examination:    Condition Necessitating Transfer The primary encounter diagnosis was Avulsion of skin of left foot, initial encounter  Diagnoses of Avulsion of skin of right foot, initial encounter and Foreign body in right foot, initial encounter were also pertinent to this visit      Patient Condition: The patient has been stabilized such that within reasonable medical probability, no material deterioration of the patient condition or the condition of the unborn child(jd) is likely to result from the transfer    Reason for Transfer: Level of Care needed not available at this facility(podiatry)    Transfer Requirements: Facility [facility] available and qualified personnel available for treatment as acknowledged by    · Agreed to accept transfer and to provide appropriate medical treatment as acknowledged by       Erin Jackson  · Appropriate medical records of the examination and treatment of the patient are provided at the time of transfer   STAFF INITIAL WHEN COMPLETED _______  · Transfer will be performed by qualified personnel from    and appropriate transfer equipment as required, including the use of necessary and appropriate life support measures  Provider Certification: I have examined the patient and explained the following risks and benefits of being transferred/refusing transfer to the patient/family:  General risk, such as traffic hazards, adverse weather conditions, rough terrain or turbulence, possible failure of equipment (including vehicle or aircraft), or consequences of actions of persons outside the control of the transport personnel      Based on these reasonable risks and benefits to the patient and/or the unborn child(jd), and based upon the information available at the time of the patients examination, I certify that the medical benefits reasonably to be expected from the provision of appropriate medical treatments at another medical facility outweigh the increasing risks, if any, to the individuals medical condition, and in the case of labor to the unborn child, from effecting the transfer      X____________________________________________ DATE 07/06/19        TIME_______      ORIGINAL - SEND TO MEDICAL RECORDS   COPY - SEND WITH PATIENT DURING TRANSFER

## 2019-07-06 NOTE — ANESTHESIA PREPROCEDURE EVALUATION
Review of Systems/Medical History  Patient summary reviewed        Cardiovascular   Pulmonary       GI/Hepatic            Endo/Other    Obesity    GYN       Hematology   Musculoskeletal       Neurology   Psychology                Anesthesia Plan  ASA Score- 2     Anesthesia Type-     Plan Factors-    Induction- intravenous  Postoperative Plan- Plan for postoperative opioid use  Informed Consent- Anesthetic plan and risks discussed with patient  I personally reviewed this patient with the CRNA  Discussed and agreed on the Anesthesia Plan with the CRNA  Alexis Rose

## 2019-07-07 LAB
ANION GAP SERPL CALCULATED.3IONS-SCNC: 2 MMOL/L (ref 4–13)
BUN SERPL-MCNC: 23 MG/DL (ref 5–25)
CALCIUM SERPL-MCNC: 8.6 MG/DL (ref 8.3–10.1)
CHLORIDE SERPL-SCNC: 106 MMOL/L (ref 100–108)
CO2 SERPL-SCNC: 28 MMOL/L (ref 21–32)
CREAT SERPL-MCNC: 1.25 MG/DL (ref 0.6–1.3)
GFR SERPL CREATININE-BSD FRML MDRD: 71 ML/MIN/1.73SQ M
GLUCOSE SERPL-MCNC: 126 MG/DL (ref 65–140)
POTASSIUM SERPL-SCNC: 4.2 MMOL/L (ref 3.5–5.3)
SODIUM SERPL-SCNC: 136 MMOL/L (ref 136–145)

## 2019-07-07 PROCEDURE — G8988 SELF CARE GOAL STATUS: HCPCS

## 2019-07-07 PROCEDURE — 99232 SBSQ HOSP IP/OBS MODERATE 35: CPT | Performed by: PODIATRIST

## 2019-07-07 PROCEDURE — 97167 OT EVAL HIGH COMPLEX 60 MIN: CPT

## 2019-07-07 PROCEDURE — 99232 SBSQ HOSP IP/OBS MODERATE 35: CPT | Performed by: FAMILY MEDICINE

## 2019-07-07 PROCEDURE — 80048 BASIC METABOLIC PNL TOTAL CA: CPT | Performed by: FAMILY MEDICINE

## 2019-07-07 PROCEDURE — G8987 SELF CARE CURRENT STATUS: HCPCS

## 2019-07-07 RX ORDER — DOCUSATE SODIUM 100 MG/1
100 CAPSULE, LIQUID FILLED ORAL DAILY
Status: DISCONTINUED | OUTPATIENT
Start: 2019-07-07 | End: 2019-07-08 | Stop reason: HOSPADM

## 2019-07-07 RX ORDER — METRONIDAZOLE 500 MG/1
500 TABLET ORAL EVERY 8 HOURS SCHEDULED
Status: DISCONTINUED | OUTPATIENT
Start: 2019-07-07 | End: 2019-07-08 | Stop reason: HOSPADM

## 2019-07-07 RX ADMIN — HEPARIN SODIUM 5000 UNITS: 5000 INJECTION INTRAVENOUS; SUBCUTANEOUS at 04:50

## 2019-07-07 RX ADMIN — CEFAZOLIN SODIUM 1000 MG: 1 SOLUTION INTRAVENOUS at 08:07

## 2019-07-07 RX ADMIN — OXYCODONE HYDROCHLORIDE AND ACETAMINOPHEN 1 TABLET: 5; 325 TABLET ORAL at 08:07

## 2019-07-07 RX ADMIN — OXYCODONE HYDROCHLORIDE AND ACETAMINOPHEN 2 TABLET: 5; 325 TABLET ORAL at 12:13

## 2019-07-07 RX ADMIN — HEPARIN SODIUM 5000 UNITS: 5000 INJECTION INTRAVENOUS; SUBCUTANEOUS at 13:09

## 2019-07-07 RX ADMIN — OXYCODONE HYDROCHLORIDE AND ACETAMINOPHEN 2 TABLET: 5; 325 TABLET ORAL at 21:43

## 2019-07-07 RX ADMIN — DOCUSATE SODIUM 100 MG: 100 CAPSULE, LIQUID FILLED ORAL at 16:57

## 2019-07-07 RX ADMIN — METRONIDAZOLE 500 MG: 500 TABLET, FILM COATED ORAL at 21:43

## 2019-07-07 RX ADMIN — OXYCODONE HYDROCHLORIDE AND ACETAMINOPHEN 1 TABLET: 5; 325 TABLET ORAL at 17:02

## 2019-07-07 RX ADMIN — HEPARIN SODIUM 5000 UNITS: 5000 INJECTION INTRAVENOUS; SUBCUTANEOUS at 21:44

## 2019-07-07 RX ADMIN — CEFAZOLIN SODIUM 1000 MG: 1 SOLUTION INTRAVENOUS at 00:51

## 2019-07-07 RX ADMIN — METRONIDAZOLE 500 MG: 500 TABLET, FILM COATED ORAL at 13:09

## 2019-07-07 RX ADMIN — CEFAZOLIN SODIUM 1000 MG: 1 SOLUTION INTRAVENOUS at 16:57

## 2019-07-07 RX ADMIN — METRONIDAZOLE 500 MG: 500 TABLET, FILM COATED ORAL at 04:45

## 2019-07-07 RX ADMIN — OXYCODONE HYDROCHLORIDE AND ACETAMINOPHEN 2 TABLET: 5; 325 TABLET ORAL at 01:00

## 2019-07-07 RX ADMIN — SERTRALINE HYDROCHLORIDE 50 MG: 50 TABLET ORAL at 08:07
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
## 2019-07-07 NOTE — PROGRESS NOTES
Progress Note - Isidro Scott 1975, 44 y o  male MRN: [id number]    Unit/Bed#: -01 Encounter: [id number]    Primary Care Provider: Mary Ann De Anda MD   Date and time admitted to hospital: 2019  1:27 AM        Acute kidney injury [facility])  Assessment & Plan  · Improved with IV fluids  · Monitor  · Creatinine from  is within normal limits  · Monitor creatinine    Depression  Assessment & Plan  · Continue with Zoloft    * Soft tissue injury of left foot  Assessment & Plan  · Status post washout and application of skin graft substitute  · Management per Podiatry      VTE Pharmacologic Prophylaxis:   Pharmacologic: Heparin  Mechanical VTE Prophylaxis in Place: Yes    Patient Centered Rounds: I have performed bedside rounds with nursing staff today  Discussions with Specialists or Other Care Team Provider:     Education and Discussions with Family / Patient:     Time Spent for Care: 30 minutes  More than 50% of total time spent on counseling and coordination of care as described above  Current Length of Stay: 1 day(s)    Current Patient Status: Inpatient   Certification Statement: The patient will continue to require additional inpatient hospital stay due to Pending rehab    Discharge Plan:     Code Status: Level 1 - Full Code      Subjective:   Patient seen and examined  No specific complaints offered  Feeling better  Objective:     Vitals:   Temp (24hrs), Av 1 °F (36 7 °C), Min:97 7 °F (36 5 °C), Max:98 4 °F (36 9 °C)    Temp:  [97 7 °F (36 5 °C)-98 4 °F (36 9 °C)] 97 7 °F (36 5 °C)  HR:  [66-88] 66  Resp:  [14-21] 14  BP: (133-151)/(75-86) 139/78  SpO2:  [94 %-98 %] 98 %  Body mass index is 38 75 kg/m²  Input and Output Summary (last 24 hours):        Intake/Output Summary (Last 24 hours) at 2019 1826  Last data filed at 2019 1201  Gross per 24 hour   Intake 1190 ml   Output 1725 ml   Net -535 ml       Physical Exam:     Physical Exam   Constitutional: He appears well-developed  No distress  HENT:   Head: Normocephalic and atraumatic  Eyes: Pupils are equal, round, and reactive to light  Neck: Normal range of motion  No JVD present  Cardiovascular: Normal rate and regular rhythm  No murmur heard  Pulmonary/Chest: Effort normal  No stridor  No respiratory distress  Abdominal: Soft  Bowel sounds are normal  He exhibits no distension  Musculoskeletal: He exhibits no edema  Bilateral  Foot covered in dressing   Neurological: He is alert  No cranial nerve deficit  Coordination normal        Additional Data:     Labs:    Results from last 7 days   Lab Units 07/06/19  0533   WBC Thousand/uL 9 35   HEMOGLOBIN g/dL 14 7   HEMATOCRIT % 43 4   PLATELETS Thousands/uL 206  212   NEUTROS PCT % 75   LYMPHS PCT % 16   MONOS PCT % 8   EOS PCT % 1     Results from last 7 days   Lab Units 07/07/19  0532  07/06/19  0038   POTASSIUM mmol/L 4 2   < > 3 8   CHLORIDE mmol/L 106   < > 102   CO2 mmol/L 28   < > 26   BUN mg/dL 23   < > 29*   CREATININE mg/dL 1 25   < > 1 49*   CALCIUM mg/dL 8 6   < > 8 9   ALK PHOS U/L  --   --  109   ALT U/L  --   --  29   AST U/L  --   --  21    < > = values in this interval not displayed  * I Have Reviewed All Lab Data Listed Above  * Additional Pertinent Lab Tests Reviewed:  Aless[facility] 67 Admission Reviewed    Imaging:    Imaging Reports Reviewed Today Include:   Imaging Personally Reviewed by Myself Includes:      Recent Cultures (last 7 days):           Last 24 Hours Medication List:     Current Facility-Administered Medications:  acetaminophen 650 mg Oral Q6H PRN Amie Dry, DPM    cefazolin 1,000 mg Intravenous Q8H Amie Dry, DPM Last Rate: 1,000 mg (07/07/19 1657)   docusate sodium 100 mg Oral Daily Amie Dry, DPM    heparin (porcine) 5,000 Units Subcutaneous [facility] Amie Dry, DPM    metroNIDAZOLE 500 mg Oral [facility] Romayne Piedra, DPM    morphine injection 1 mg Intravenous Q4H PRN Amie Dry, DPM    oxyCODONE-acetaminophen 1 tablet Oral Q4H PRN Krunal Gordillo, KAI    oxyCODONE-acetaminophen 2 tablet Oral Q4H PRN Krunal Gordillo, KAI    sertraline 50 mg Oral Daily Krunal Gordillo DPM         Today, Patient Was Seen By: Luis Fernando Savage MD    ** Please Note: Dictation voice to text software may have been used in the creation of this document   **
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
## 2019-07-07 NOTE — ASSESSMENT & PLAN NOTE
· Improved with IV fluids  · Monitor  · Creatinine from 12/18 is within normal limits  · Monitor creatinine
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
## 2019-07-07 NOTE — PLAN OF CARE
Problem: Potential for Falls  Goal: Patient will remain free of falls  Description  INTERVENTIONS:  - Assess patient frequently for physical needs  -  Identify cognitive and physical deficits and behaviors that affect risk of falls    -  [place] fall precautions as indicated by assessment   - Educate patient/family on patient safety including physical limitations  - Instruct patient to call for assistance with activity based on assessment  - Modify environment to reduce risk of injury  - Consider OT/PT consult to assist with strengthening/mobility  Outcome: Progressing     Problem: PAIN - ADULT  Goal: Verbalizes/displays adequate comfort level or baseline comfort level  Description  Interventions:  - Encourage patient to monitor pain and request assistance  - Assess pain using appropriate pain scale  - Administer analgesics based on type and severity of pain and evaluate response  - Implement non-pharmacological measures as appropriate and evaluate response  - Consider cultural and social influences on pain and pain management  - Notify physician/advanced practitioner if interventions unsuccessful or patient reports new pain  Outcome: Progressing     Problem: INFECTION - ADULT  Goal: Absence or prevention of progression during hospitalization  Description  INTERVENTIONS:  - Assess and monitor for signs and symptoms of infection  - Monitor lab/diagnostic results  - Monitor all insertion sites, i e  indwelling lines, tubes, and drains  - Monitor endotracheal (as able) and nasal secretions for changes in amount and color  - [place] appropriate cooling/warming therapies per order  - Administer medications as ordered  - Instruct and encourage patient and family to use good hand hygiene technique  - Identify and instruct in appropriate isolation precautions for identified infection/condition  Outcome: Progressing  Goal: Absence of fever/infection during neutropenic period  Description  INTERVENTIONS:  - Monitor WBC  Outcome: Progressing     Problem: SAFETY ADULT  Goal: Patient will remain free of falls  Description  INTERVENTIONS:  - Assess patient frequently for physical needs  -  Identify cognitive and physical deficits and behaviors that affect risk of falls  -  [place] fall precautions as indicated by assessment   - Educate patient/family on patient safety including physical limitations  - Instruct patient to call for assistance with activity based on assessment  - Modify environment to reduce risk of injury  - Consider OT/PT consult to assist with strengthening/mobility  Outcome: Progressing  Goal: Maintain or return to baseline ADL function  Description  INTERVENTIONS:  - Assess patient frequently for physical needs  -  Identify cognitive and physical deficits and behaviors that affect risk of falls    -  [place] fall precautions as indicated by assessment   - Educate patient/family on patient safety including physical limitations  - Instruct patient to call for assistance with activity based on assessment  - Modify environment to reduce risk of injury  - Consider OT/PT consult to assist with strengthening/mobility  Outcome: Progressing  Goal: Maintain or return mobility status to optimal level  Description  INTERVENTIONS:  -  Assess patient's ability to carry out ADLs; assess patient's baseline for ADL function and identify physical deficits which impact ability to perform ADLs (bathing, care of mouth/teeth, toileting, grooming, dressing, etc )  - Assess/evaluate cause of self-care deficits   - Assess range of motion  - Assess patient's mobility; develop plan if impaired  - Assess patient's need for assistive devices and provide as appropriate  - Encourage maximum independence but intervene and supervise when necessary  ¯ Involve family in performance of ADLs  ¯ Assess for home care needs following discharge   ¯ Request OT consult to assist with ADL evaluation and planning for discharge  ¯ Provide patient education as appropriate  Outcome: Progressing     Problem: DISCHARGE PLANNING  Goal: Discharge to home or other facility with appropriate resources  Description  INTERVENTIONS:  - Identify barriers to discharge w/patient and caregiver  - Arrange for needed discharge resources and transportation as appropriate  - Identify discharge learning needs (meds, wound care, etc )  - Arrange for interpretive services to assist at discharge as needed  - Refer to Case Management Department for coordinating discharge planning if the patient needs post-hospital services based on physician/advanced practitioner order or complex needs related to functional status, cognitive ability, or social support system  Outcome: Progressing     Problem: Knowledge Deficit  Goal: Patient/family/caregiver demonstrates understanding of disease process, treatment plan, medications, and discharge instructions  Description  Complete learning assessment and assess knowledge base    Interventions:  - Provide teaching at level of understanding  - Provide teaching via preferred learning methods  Outcome: Progressing     Problem: Prexisting or High Potential for Compromised Skin Integrity  Goal: Skin integrity is maintained or improved  Description  INTERVENTIONS:  - Identify patients at risk for skin breakdown  - Assess and monitor skin integrity  - Assess and monitor nutrition and hydration status  - Monitor labs (i e  albumin)  - Assess for incontinence   - Turn and reposition patient  - Assist with mobility/ambulation  - Relieve pressure over bony prominences  - Avoid friction and shearing  - Provide appropriate hygiene as needed including keeping skin clean and dry  - Evaluate need for skin moisturizer/barrier cream  - Collaborate with interdisciplinary team (i e  Nutrition, Rehabilitation, etc )   - Patient/family teaching  Outcome: Progressing

## 2019-07-07 NOTE — SOCIAL WORK
CM met with Pt and spouse Kenny Stewart with an introduction and explanation of role  Pt confirmed residing with Kenny Stewart in a 2 story home with no use of DME and 2 steps to enter the home  Pt reported being independent with ADLs with no hx of VNA, SNF, mental health or drug/alcohol placements  Pt denied having a living will, reported the use of [facility] on [address] and has Reyes Gambino as a PCP  CM discuss the post acute care recommendation was made by your care team for [address] 79  Discussed Freedom of Choice with patient  List of agencies given to patient via in person  patient aware the list is custom filtered for them by insurance and that [facility] post acute providers are designated  Pt requested a referral to [facility] for his RN services  CM made the requested referral     CM reviewed d/c planning process including the following: identifying help at home, patient preference for d/c planning needs, Discharge Lounge, [facility] Meds to Bed program, availability of treatment team to discuss questions or concerns patient and/or family may have regarding understanding medications and recognizing signs and symptoms once discharged  CM also encouraged patient to follow up with all recommended appointments after discharge  Patient advised of importance for patient and family to participate in managing patients medical well being

## 2019-07-08 VITALS
TEMPERATURE: 98.1 F | HEART RATE: 65 BPM | RESPIRATION RATE: 18 BRPM | SYSTOLIC BLOOD PRESSURE: 141 MMHG | OXYGEN SATURATION: 100 % | DIASTOLIC BLOOD PRESSURE: 85 MMHG | BODY MASS INDEX: 38.54 KG/M2 | WEIGHT: 310 LBS | HEIGHT: 75 IN

## 2019-07-08 LAB
ANION GAP SERPL CALCULATED.3IONS-SCNC: 5 MMOL/L (ref 4–13)
BUN SERPL-MCNC: 19 MG/DL (ref 5–25)
CALCIUM SERPL-MCNC: 8.6 MG/DL (ref 8.3–10.1)
CHLORIDE SERPL-SCNC: 106 MMOL/L (ref 100–108)
CO2 SERPL-SCNC: 29 MMOL/L (ref 21–32)
CREAT SERPL-MCNC: 1.24 MG/DL (ref 0.6–1.3)
GFR SERPL CREATININE-BSD FRML MDRD: 71 ML/MIN/1.73SQ M
GLUCOSE SERPL-MCNC: 84 MG/DL (ref 65–140)
POTASSIUM SERPL-SCNC: 4.5 MMOL/L (ref 3.5–5.3)
SODIUM SERPL-SCNC: 140 MMOL/L (ref 136–145)

## 2019-07-08 PROCEDURE — G8978 MOBILITY CURRENT STATUS: HCPCS

## 2019-07-08 PROCEDURE — 97535 SELF CARE MNGMENT TRAINING: CPT | Performed by: STUDENT IN AN ORGANIZED HEALTH CARE EDUCATION/TRAINING PROGRAM

## 2019-07-08 PROCEDURE — 97163 PT EVAL HIGH COMPLEX 45 MIN: CPT

## 2019-07-08 PROCEDURE — 80048 BASIC METABOLIC PNL TOTAL CA: CPT | Performed by: FAMILY MEDICINE

## 2019-07-08 PROCEDURE — 99238 HOSP IP/OBS DSCHRG MGMT 30/<: CPT | Performed by: PODIATRIST

## 2019-07-08 PROCEDURE — NC001 PR NO CHARGE: Performed by: PODIATRIST

## 2019-07-08 PROCEDURE — G8979 MOBILITY GOAL STATUS: HCPCS

## 2019-07-08 RX ORDER — OXYCODONE HYDROCHLORIDE AND ACETAMINOPHEN 5; 325 MG/1; MG/1
2 TABLET ORAL EVERY 4 HOURS PRN
Qty: 15 TABLET | Refills: 0 | Status: SHIPPED | OUTPATIENT
Start: 2019-07-08 | End: 2019-07-18

## 2019-07-08 RX ORDER — CEPHALEXIN 500 MG/1
500 CAPSULE ORAL EVERY 6 HOURS SCHEDULED
Qty: 28 CAPSULE | Refills: 0 | Status: SHIPPED | OUTPATIENT
Start: 2019-07-08 | End: 2019-07-15

## 2019-07-08 RX ADMIN — METRONIDAZOLE 500 MG: 500 TABLET, FILM COATED ORAL at 04:54

## 2019-07-08 RX ADMIN — SERTRALINE HYDROCHLORIDE 50 MG: 50 TABLET ORAL at 08:41

## 2019-07-08 RX ADMIN — OXYCODONE HYDROCHLORIDE AND ACETAMINOPHEN 1 TABLET: 5; 325 TABLET ORAL at 15:46

## 2019-07-08 RX ADMIN — HEPARIN SODIUM 5000 UNITS: 5000 INJECTION INTRAVENOUS; SUBCUTANEOUS at 04:55

## 2019-07-08 RX ADMIN — CEFAZOLIN SODIUM 1000 MG: 1 SOLUTION INTRAVENOUS at 00:28

## 2019-07-08 RX ADMIN — DOCUSATE SODIUM 100 MG: 100 CAPSULE, LIQUID FILLED ORAL at 08:41

## 2019-07-08 RX ADMIN — CEFAZOLIN SODIUM 1000 MG: 1 SOLUTION INTRAVENOUS at 08:45

## 2019-07-08 RX ADMIN — OXYCODONE HYDROCHLORIDE AND ACETAMINOPHEN 2 TABLET: 5; 325 TABLET ORAL at 04:53

## 2019-07-08 NOTE — PROGRESS NOTES
07/08/19 1400   Spiritual Beliefs/Perceptions   Support Systems Spouse/significant other   Plan of Care   Comments Pt  visited provided listening support will be dischared today     Assessment Completed by: Unit visit
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
## 2019-07-08 NOTE — PLAN OF CARE
Problem: PHYSICAL THERAPY ADULT  Goal: Performs mobility at highest level of function for planned discharge setting  See evaluation for individualized goals  Description  Treatment/Interventions: Functional transfer training, LE strengthening/ROM, Therapeutic exercise, Endurance training, Patient/family training, Equipment eval/education, Bed mobility  Equipment Recommended: Wheelchair(W/C with elevating leg rests, drop arm commode, slide board)       See flowsheet documentation for full assessment, interventions and recommendations  Note:   Prognosis: Good  Problem List: Decreased endurance, Impaired balance, Decreased mobility, Decreased skin integrity, Orthopedic restrictions  Assessment: Pt is a 44 y o  male seen for PT evaluation s/p admit to [facility] on 7/6/2019 w/ Soft tissue injury of left foot  Order placed for PT  Comorbidities affecting pt's physical performance at time of assessment listed above  Personal factors affecting pt at time of IE include: steps to enter environment, multi-level environment, inability to perform IADLs, inability to perform ADLs and inability to ambulate household distances  Prior to admission, pt was was independent w/ all functional mobility w/ out AD, lived in multi-level home and lived with wife  Upon evaluation: Pt requires supervision for bed mobility and supervision for slide board transfer bed to drop arm chair  (Please find full objective findings from PT assessment regarding body systems outlined above)  Impairments and limitations also listed above, especially due to  impaired balance, decreased endurance, decreased activity tolerance, decreased safety awareness, fall risk and orthopedic restrictions  The following objective measures performed on IE also reveal limitations: Barthel Index 55/100  Pt's clinical presentation is currently unstable/unpredictable seen in pt's presentation of fall risk and new WBS    Pt to benefit from continued skilled PT tx while in hospital and upon DC to address deficits as defined above and maximize level of functional mobility  From PT/mobility standpoint, recommendation at time of d/c would be Home PT and home with family support with W/C, slide board, and drop arm commode  Recommend progression of transfers and W/C mobility as appropriate  Recommendation: Home with family support, Home PT          See flowsheet documentation for full assessment

## 2019-07-08 NOTE — OCCUPATIONAL THERAPY NOTE
07/08/19 0910   Restrictions/Precautions   Weight Bearing Precautions Per Order Yes   RLE Weight Bearing Per Order NWB   LLE Weight Bearing Per Order NWB   Other Precautions Fall Risk;Pain;WBS   Pain Assessment   Pain Assessment 0-10   Pain Score 4   ADL   Where Assessed Supine, bed  (HOB elevated)   Grooming Assistance 5  Supervision/Setup   Grooming Deficit Setup   Grooming Comments brush teeth   UB Bathing Assistance 5  Supervision/Setup   UB Bathing Deficit Setup   LB Bathing Assistance 4  Minimal Assistance   LB Bathing Deficit Setup; Buttocks   UB Dressing Assistance 5  Supervision/Setup   UB Dressing Deficit Setup   Toileting Assistance  4  Minimal Assistance   Toileting Deficit Setup;Supervison/safety; Perineal hygiene   Toileting Comments slideboard transfer for BM   Bed Mobility   Supine to Sit 5  Supervision   Additional items HOB elevated   Sit to Supine 5  Supervision   Additional items HOB elevated   Transfers   Sliding Board transfer 4  Minimal assistance   Additional items Assist x 1   Cognition   Overall Cognitive Status WFL   Arousal/Participation Responsive   Attention Within functional limits   Orientation Level Oriented X4   Memory Within functional limits   Following Commands Follows all commands and directions without difficulty   Activity Tolerance   Activity Tolerance Patient tolerated treatment well   Assessment   Assessment Pt participates in OT session with focus on toileting, transfers, bed mobility, bathing, dressing, and grooming to increase I for d/c  Pt setup grooming to brush teeth while seated upright in bed  Pt supervision supine to sit EOB with HOB elevated  Pt CGA slideboard transfer Ax1 from EOB to commode  Pt CGA toileting for BM and requires steadying A during toilet hygiene as well as steadying of commode  Pt CGA slideboard transfer back to EOB from commode  Pt min A bathing with SB while supine in bed with HOB elevated and pt requires A to wash buttocks 2* decreased ROM  Pt setup UB dressing to don/doff gown  Pt will continue to benefit from activity tolerance, adls, and transfers  Plan   Treatment Interventions ADL retraining;Functional transfer training; Endurance training; Activityengagement   Goal Expiration Date 07/21/19   Treatment Day 1   OT Frequency 3-5x/wk   Recommendation   OT Discharge Recommendation Home OT  (Home OT pending progress vs STR)

## 2019-07-08 NOTE — OCCUPATIONAL THERAPY NOTE
07/08/19 1015   Restrictions/Precautions   Weight Bearing Precautions Per Order Yes   RLE Weight Bearing Per Order NWB   LLE Weight Bearing Per Order NWB   Other Precautions Fall Risk;Pain;WBS   Pain Assessment   Pain Assessment 0-10   Pain Score 4   ADL   Where Assessed Commode   Toileting Assistance  4  Minimal Assistance   Toileting Deficit Setup;Steadying; Bedside commode;Supervison/safety   Toileting Comments slideboard transfer   Transfers   Sliding Board transfer 4  Minimal assistance   Additional items Assist x 1   Cognition   Overall Cognitive Status WFL   Arousal/Participation Responsive   Attention Within functional limits   Orientation Level Oriented X4   Memory Within functional limits   Following Commands Follows all commands and directions without difficulty   Activity Tolerance   Activity Tolerance Patient tolerated treatment well   Assessment   Assessment Pt participates in 2nd OT session with focus on toileting and transfers  Pt supervision supine to sit EOB with HOB elevated  Pt CGA slideboard transfer from EOB to commode with steadying support on commode  Pt CGA toileting for BM and able to manage toilet hygiene with CGA  Pt CGA slideboard transfer back to EOB  Pt will continue to benefit from activity tolerance, adls, and transfers  Plan   Treatment Interventions ADL retraining;Functional transfer training; Activityengagement   Goal Expiration Date 07/21/19   Treatment Day 2   OT Frequency 3-5x/wk   Recommendation   OT Discharge Recommendation Home OT  (Home OT pending progress vs STR)
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
## 2019-07-08 NOTE — DISCHARGE INSTRUCTIONS
BMP in a week-contact your primary care physician to obtain the blood work    Discharge Instructions - Podiatry    Weight Bearing Status:  Non weight bearing to bilateral lower extremity with wheelchair                                   Pain: Continue analgesics as directed    F/u appointment Instructions: Please make an appointment within one week of discharge with Dr Waldemar Menjivar  Contact sooner if any increase in pain, or signs of infection occur    Wound Care: VNA to change dressings twice a week with xeroform and dry sterile dressings bilateral feet

## 2019-07-08 NOTE — SOCIAL WORK
A post acute care recommendation was made by the care team for [facility], BS and Sliding Board for transfers  Discussed Freedom of Choice with pt/wife  They had no preference for DME company and chose [employer]  CM made referral to [employer] and notified Eleuterio Scheuermann of pt's discharge home today  Wife chose [facility] ([phone])  CM spoke with [facility] and they will transport pt at 4pm today to home  CM consulted with physician, therapy and pt's nurse prior to discharge
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
## 2019-07-08 NOTE — PLAN OF CARE
Problem: Potential for Falls  Goal: Patient will remain free of falls  Description  INTERVENTIONS:  - Assess patient frequently for physical needs  -  Identify cognitive and physical deficits and behaviors that affect risk of falls    -  [place] fall precautions as indicated by assessment   - Educate patient/family on patient safety including physical limitations  - Instruct patient to call for assistance with activity based on assessment  - Modify environment to reduce risk of injury  - Consider OT/PT consult to assist with strengthening/mobility  Outcome: Progressing     Problem: PAIN - ADULT  Goal: Verbalizes/displays adequate comfort level or baseline comfort level  Description  Interventions:  - Encourage patient to monitor pain and request assistance  - Assess pain using appropriate pain scale  - Administer analgesics based on type and severity of pain and evaluate response  - Implement non-pharmacological measures as appropriate and evaluate response  - Consider cultural and social influences on pain and pain management  - Notify physician/advanced practitioner if interventions unsuccessful or patient reports new pain  Outcome: Progressing     Problem: INFECTION - ADULT  Goal: Absence or prevention of progression during hospitalization  Description  INTERVENTIONS:  - Assess and monitor for signs and symptoms of infection  - Monitor lab/diagnostic results  - Monitor all insertion sites, i e  indwelling lines, tubes, and drains  - Monitor endotracheal (as able) and nasal secretions for changes in amount and color  - [place] appropriate cooling/warming therapies per order  - Administer medications as ordered  - Instruct and encourage patient and family to use good hand hygiene technique  - Identify and instruct in appropriate isolation precautions for identified infection/condition  Outcome: Progressing  Goal: Absence of fever/infection during neutropenic period  Description  INTERVENTIONS:  - Monitor WBC  - Implement neutropenic guidelines  Outcome: Progressing     Problem: SAFETY ADULT  Goal: Patient will remain free of falls  Description  INTERVENTIONS:  - Assess patient frequently for physical needs  -  Identify cognitive and physical deficits and behaviors that affect risk of falls    -  [place] fall precautions as indicated by assessment   - Educate patient/family on patient safety including physical limitations  - Instruct patient to call for assistance with activity based on assessment  - Modify environment to reduce risk of injury  - Consider OT/PT consult to assist with strengthening/mobility  Outcome: Progressing  Goal: Maintain or return to baseline ADL function  Description  INTERVENTIONS:  -  Assess patient's ability to carry out ADLs; assess patient's baseline for ADL function and identify physical deficits which impact ability to perform ADLs (bathing, care of mouth/teeth, toileting, grooming, dressing, etc )  - Assess/evaluate cause of self-care deficits   - Assess range of motion  - Assess patient's mobility; develop plan if impaired  - Assess patient's need for assistive devices and provide as appropriate  - Encourage maximum independence but intervene and supervise when necessary  ¯ Involve family in performance of ADLs  ¯ Assess for home care needs following discharge   ¯ Request OT consult to assist with ADL evaluation and planning for discharge  ¯ Provide patient education as appropriate  Outcome: Progressing  Goal: Maintain or return mobility status to optimal level  Description  INTERVENTIONS:  - Assess patient's baseline mobility status (ambulation, transfers, stairs, etc )    - Identify cognitive and physical deficits and behaviors that affect mobility  - Identify mobility aids required to assist with transfers and/or ambulation (gait belt, sit-to-stand, lift, walker, cane, etc )  - [place] fall precautions as indicated by assessment  - Record patient progress and toleration of activity level on Mobility SBAR; progress patient to next Phase/Stage  - Instruct patient to call for assistance with activity based on assessment  - Request Rehabilitation consult to assist with strengthening/weightbearing, etc   Outcome: Progressing     Problem: DISCHARGE PLANNING  Goal: Discharge to home or other facility with appropriate resources  Description  INTERVENTIONS:  - Identify barriers to discharge w/patient and caregiver  - Arrange for needed discharge resources and transportation as appropriate  - Identify discharge learning needs (meds, wound care, etc )  - Arrange for interpretive services to assist at discharge as needed  - Refer to Case Management Department for coordinating discharge planning if the patient needs post-hospital services based on physician/advanced practitioner order or complex needs related to functional status, cognitive ability, or social support system  Outcome: Progressing     Problem: Knowledge Deficit  Goal: Patient/family/caregiver demonstrates understanding of disease process, treatment plan, medications, and discharge instructions  Description  Complete learning assessment and assess knowledge base    Interventions:  - Provide teaching at level of understanding  - Provide teaching via preferred learning methods  Outcome: Progressing     Problem: Prexisting or High Potential for Compromised Skin Integrity  Goal: Skin integrity is maintained or improved  Description  INTERVENTIONS:  - Identify patients at risk for skin breakdown  - Assess and monitor skin integrity  - Assess and monitor nutrition and hydration status  - Monitor labs (i e  albumin)  - Assess for incontinence   - Turn and reposition patient  - Assist with mobility/ambulation  - Relieve pressure over bony prominences  - Avoid friction and shearing  - Provide appropriate hygiene as needed including keeping skin clean and dry  - Evaluate need for skin moisturizer/barrier cream  - Collaborate with interdisciplinary team (i e  Nutrition, Rehabilitation, etc )   - Patient/family teaching  Outcome: Progressing

## 2019-07-08 NOTE — PLAN OF CARE
Problem: OCCUPATIONAL THERAPY ADULT  Goal: Performs self-care activities at highest level of function for planned discharge setting  See evaluation for individualized goals  Description  Treatment Interventions: ADL retraining, Functional transfer training, Endurance training, UE strengthening/ROM, Patient/family training, Equipment evaluation/education, Compensatory technique education, Continued evaluation, Energy conservation, Activityengagement  Equipment Recommended: Bedside commode, Tub seat with back, Other (comment)(wheelchair)       See flowsheet documentation for full assessment, interventions and recommendations  7/8/2019 1046 by MARIXA Saha  Outcome: Progressing  Note:   Limitation: Decreased ADL status, Decreased endurance, Decreased self-care trans, Decreased high-level ADLs  Prognosis: Fair  Assessment: Pt participates in 2nd OT session with focus on toileting and transfers  Pt supervision supine to sit EOB with HOB elevated  Pt CGA slideboard transfer from EOB to commode with steadying support on commode  Pt CGA toileting for BM and able to manage toilet hygiene with CGA  Pt CGA slideboard transfer back to EOB  Pt will continue to benefit from activity tolerance, adls, and transfers  OT Discharge Recommendation: Home OT(Home OT pending progress vs STR)       7/8/2019 0947 by MARIXA Saha  Outcome: Progressing  Note:   Limitation: Decreased ADL status, Decreased endurance, Decreased self-care trans, Decreased high-level ADLs  Prognosis: Fair  Assessment: Pt participates in OT session with focus on toileting, transfers, bed mobility, bathing, dressing, and grooming to increase I for d/c  Pt setup grooming to brush teeth while seated upright in bed  Pt supervision supine to sit EOB with HOB elevated  Pt CGA slideboard transfer Ax1 from EOB to commode  Pt CGA toileting for BM and requires steadying A during toilet hygiene as well as steadying of commode   Pt CGA slideboard transfer back to EOB from commode  Pt min A bathing with SB while supine in bed with HOB elevated and pt requires A to wash buttocks 2* decreased ROM  Pt setup UB dressing to don/doff gown  Pt will continue to benefit from activity tolerance, adls, and transfers       OT Discharge Recommendation: Home OT(Home OT pending progress vs STR)

## 2019-07-08 NOTE — PROGRESS NOTES
07/08/19 1400   Clinical Encounter Type   Visited With Patient   Routine Visit Introduction   Continue Visiting No

## 2019-07-08 NOTE — PLAN OF CARE
Problem: OCCUPATIONAL THERAPY ADULT  Goal: Performs self-care activities at highest level of function for planned discharge setting  See evaluation for individualized goals  Description  Treatment Interventions: ADL retraining, Functional transfer training, Endurance training, UE strengthening/ROM, Patient/family training, Equipment evaluation/education, Compensatory technique education, Continued evaluation, Energy conservation, Activityengagement  Equipment Recommended: Bedside commode, Tub seat with back, Other (comment)(wheelchair)       See flowsheet documentation for full assessment, interventions and recommendations  Outcome: Progressing  Note:   Limitation: Decreased ADL status, Decreased endurance, Decreased self-care trans, Decreased high-level ADLs  Prognosis: Fair  Assessment: Pt participates in OT session with focus on toileting, transfers, bed mobility, bathing, dressing, and grooming to increase I for d/c  Pt setup grooming to brush teeth while seated upright in bed  Pt supervision supine to sit EOB with HOB elevated  Pt CGA slideboard transfer Ax1 from EOB to commode  Pt CGA toileting for BM and requires steadying A during toilet hygiene as well as steadying of commode  Pt CGA slideboard transfer back to EOB from commode  Pt min A bathing with SB while supine in bed with HOB elevated and pt requires A to wash buttocks 2* decreased ROM  Pt setup UB dressing to don/doff gown  Pt will continue to benefit from activity tolerance, adls, and transfers       OT Discharge Recommendation: Home OT(Home OT pending progress vs STR)

## 2019-07-16 ENCOUNTER — TELEPHONE (OUTPATIENT)
Dept: PODIATRY | Facility: CLINIC | Age: 44
End: 2019-07-16

## 2019-07-17 ENCOUNTER — TELEPHONE (OUTPATIENT)
Dept: PODIATRY | Facility: CLINIC | Age: 44
End: 2019-07-17

## 2019-07-24 ENCOUNTER — OFFICE VISIT (OUTPATIENT)
Dept: PODIATRY | Facility: CLINIC | Age: 44
End: 2019-07-24
Payer: COMMERCIAL

## 2019-07-24 VITALS
BODY MASS INDEX: 37.3 KG/M2 | DIASTOLIC BLOOD PRESSURE: 95 MMHG | HEIGHT: 75 IN | WEIGHT: 300 LBS | SYSTOLIC BLOOD PRESSURE: 153 MMHG | HEART RATE: 87 BPM

## 2019-07-24 DIAGNOSIS — S99.921A SOFT TISSUE INJURY OF RIGHT FOOT, INITIAL ENCOUNTER: ICD-10-CM

## 2019-07-24 DIAGNOSIS — S99.922D: Primary | ICD-10-CM

## 2019-07-24 PROBLEM — S99.929A SOFT TISSUE INJURY OF FOOT: Status: ACTIVE | Noted: 2019-07-24

## 2019-07-24 PROCEDURE — 99214 OFFICE O/P EST MOD 30 MIN: CPT | Performed by: PODIATRIST

## 2019-07-24 RX ORDER — OXYCODONE HYDROCHLORIDE AND ACETAMINOPHEN 5; 325 MG/1; MG/1
2 TABLET ORAL EVERY 4 HOURS PRN
COMMUNITY
End: 2021-07-20 | Stop reason: SDUPTHER

## 2019-07-24 NOTE — PATIENT INSTRUCTIONS
Skin Grafting   WHAT YOU NEED TO KNOW:   What is skin grafting? Skin grafting is surgery to cover and repair wounds with a skin graft  A skin graft is a portion of healthy skin that is taken from another area of your body called the donor site  Substitute skin grafts may also be used  These grafts may be artificial or they may come from another person or animal, such as a pig  Substitute skin grafts may be used only as temporary covers when large areas of the skin are damaged  They are replaced with your own skin over time  What are the types of skin grafts? · Full-thickness:  Full-thickness grafts are used for deep wounds, so all the layers of skin are taken for the graft  This type of graft is used when it is important to match your skin color, such as on your face  It may also be used when tightening of the skin should be avoided, such as on the fingers  The graft will be trimmed to the correct size and shape to fit the wound  · Split-thickness:  Split-thickness grafts are used for shallow, large surface area wounds  The top 2 layers of skin are taken for the graft  This type of graft is also used when more blood and fluid are expected to drain from the wound  The graft is applied as a sheet if the wound is on your face, neck, or hand  It is meshed (cut and stretched) if it is not large enough to cover the wound  Where does a skin graft come from? Your graft may be taken from your legs, arms, back, abdomen, ear, eyelid, or scalp  The skin's color, texture (smoothness), hair growth, and thickness are considered in the donor area choice  The skin may be taken from an area near the injury to match the area where the graft will be placed  What are the risks of skin grafting? · The grafted skin may die, and you may need another graft  The grafted skin may not look or feel the way you expected  The skin may contract (shrink) or change color  Scars may form on the graft and donor sites   You may bleed more than expected or get an infection  Bleeding or infection under the graft may slow or prevent wound healing  You could have trouble breathing or get blood clots  · You may have continued pain or swelling after the surgery  Certain diseases or conditions may slow the healing process  Some examples are diabetes, blood vessel problems, and liver, kidney, lung, or heart conditions  Poor nutrition or a weak immune system may also cause problems with healing  What should I expect after skin grafting? · Medicines: You may need to take medicines to decrease pain or severe itching, or to fight infection  Do not take any medicine that has aspirin or blood thinners in it  These medicines may make you more likely to bleed  Take your regular medicines as directed by your healthcare provider  · Bandages:  After surgery, a bandage will be put on the graft site to hold the graft in place  Do not remove the bandage yourself  Your healthcare provider will arrange for the bandage to be removed or changed after a few days  Keep the bandage clean and dry  · Wound care:  Do not rub or scratch the donor and graft sites  Protect your wounds from direct sunlight  · Changes in activity:  You may need to avoid certain activities, such as exercise or lifting heavy objects  You may also need to avoid activities that can irritate your wounds  Ask your healthcare provider which activities you should avoid or limit  · Elevate:  Elevate your arm or leg if you have a graft or donor site there  Prop your arm or leg on pillows to raise the area above your heart as often as you can  This will help decrease swelling  · Follow-up visits:  You will need to follow up with your healthcare provider regularly to have your sutures removed, bandages changed, and wounds checked  Any fluid that has collected in the graft site will be removed  When should I contact my healthcare provider?   Contact your healthcare provider if:  · You have increased swelling and redness in or around your wound  · You have muscle, joint, or body aches, sweating, chills, or a fever  · You have pain that worsens or does not go away after you take pain medicine  · You have questions or concerns about your condition or care  When should I seek immediate care? See care immediately or call 911 if:  · You feel something is bulging out from your graft site and not going back in     · You have pus or a foul odor coming from your wound  · Blood soaks through your bandage  CARE AGREEMENT:   You have the right to help plan your care  Learn about your health condition and how it may be treated  Discuss treatment options with your caregivers to decide what care you want to receive  You always have the right to refuse treatment  The above information is an  only  It is not intended as medical advice for individual conditions or treatments  Talk to your doctor, nurse or pharmacist before following any medical regimen to see if it is safe and effective for you  © 2017 [employer] Information is for End User's use only and may not be sold, redistributed or otherwise used for commercial purposes  All illustrations and images included in CareNotes® are the copyrighted property of [employer]  or [employer]

## 2019-07-24 NOTE — PROGRESS NOTES
Assessment:  1  S/P Soft tissue degloving Right foot  2  S/P Soft tissue degloving Left foot    Plan:  1  Integra grafts and staples left intact  2  Continue strict NWB on both feet  3  DSD to be changed twice weekly  4  I reviewed the treatment options open to the patient for wound resurfacing including healing by secondary intention (no need for return to OR but long expectancy to heal), Integra re-grafting of superficial wounds, SPTSG grafting of intermediate thickness wounds and flap for deep wounds with bone exposure  I do not expect bone exposure to still be present on the Left  After long discussion of all options including pros and cons, we plan on return to OR on 8/9/29 for staples and Integra graft removal, wound inspection and debridement and A)possible re-grafting with Integra vs  B) possible SPTSG  I obtained informed consent today for the procedures  Problem List Items Addressed This Visit        Other    Soft tissue injury of left foot - Primary    Soft tissue injury of right foot             Diagnoses and all orders for this visit:    Soft tissue injury of left foot, subsequent encounter    Soft tissue injury of right foot, initial encounter    Other orders  -     oxyCODONE-acetaminophen (PERCOCET) 5-325 mg per tablet; Take 2 tablets by mouth every 4 (four) hours as needed  -     ceFAZolin (ANCEF) 3,000 mg in dextrose 5 % 100 mL IVPB          Subjective:      Patient ID: Mariela Kendall is a 44 y o  male  Henao Boron is here for his first outpatient visit  He presents NWB in wheelchair with dressings intact  He denies pain  The following portions of the patient's history were reviewed and updated as appropriate: allergies, current medications, past family history, past medical history, past social history, past surgical history and problem list     Review of Systems   Constitutional: Negative            Objective:      /95   Pulse 87   Ht 6' 3" (1 905 m) Comment: verbal  Wt 136 kg (300 lb) Comment: verbal/NWB  BMI 37 50 kg/m²          Physical Exam   Constitutional: He appears well-developed and well-nourished  No distress  Skin: He is not diaphoretic  Nursing note and vitals reviewed  The Integra grafts x 4 are intact with all staples intact  No signs of infection noted  Mild serosanginous drainage noted  No odor

## 2019-07-30 RX ORDER — ACETAMINOPHEN 325 MG/1
650 TABLET ORAL EVERY 6 HOURS PRN
COMMUNITY
End: 2021-07-20 | Stop reason: SDUPTHER

## 2019-07-30 RX ORDER — MULTIVITAMIN
1 TABLET ORAL DAILY
COMMUNITY

## 2019-07-30 NOTE — PRE-PROCEDURE INSTRUCTIONS
Pre-Surgery Instructions:   Medication Instructions    acetaminophen (TYLENOL) 325 mg tablet Instructed patient per Anesthesia Guidelines   Calcium-Magnesium-Zinc (CALCIUM-MAGNESUIUM-ZINC PO) Instructed patient per Anesthesia Guidelines   Multiple Vitamin (MULTIVITAMIN) tablet Instructed patient per Anesthesia Guidelines   oxyCODONE-acetaminophen (PERCOCET) 5-325 mg per tablet Instructed patient per Anesthesia Guidelines   sertraline (ZOLOFT) 50 mg tablet Instructed patient per Anesthesia Guidelines   TURMERIC CURCUMIN PO Instructed patient per Anesthesia Guidelines    Patient given/ instructed on use of Dial antibac soap per hospital protocol    Patient instructed to stop all ASA, NSAIDS, vitamins and herbal supplements one week prior to surgery or per Dr Roma Delcid

## 2019-08-08 ENCOUNTER — ANESTHESIA EVENT (OUTPATIENT)
Dept: PERIOP | Facility: HOSPITAL | Age: 44
End: 2019-08-08
Payer: COMMERCIAL

## 2019-08-09 ENCOUNTER — HOSPITAL ENCOUNTER (OUTPATIENT)
Facility: HOSPITAL | Age: 44
Setting detail: OUTPATIENT SURGERY
Discharge: HOME | End: 2019-08-09
Attending: PODIATRIST | Admitting: PODIATRIST
Payer: COMMERCIAL

## 2019-08-09 ENCOUNTER — ANESTHESIA (OUTPATIENT)
Dept: PERIOP | Facility: HOSPITAL | Age: 44
End: 2019-08-09
Payer: COMMERCIAL

## 2019-08-09 VITALS
SYSTOLIC BLOOD PRESSURE: 144 MMHG | WEIGHT: 300 LBS | HEIGHT: 75 IN | OXYGEN SATURATION: 96 % | DIASTOLIC BLOOD PRESSURE: 84 MMHG | HEART RATE: 96 BPM | TEMPERATURE: 97.4 F | RESPIRATION RATE: 18 BRPM | BODY MASS INDEX: 37.3 KG/M2

## 2019-08-09 DIAGNOSIS — S99.929D: Primary | ICD-10-CM

## 2019-08-09 PROCEDURE — 15002 WOUND PREP TRK/ARM/LEG: CPT | Performed by: PODIATRIST

## 2019-08-09 PROCEDURE — 99024 POSTOP FOLLOW-UP VISIT: CPT | Performed by: PODIATRIST

## 2019-08-09 DEVICE — INTEGRA® BILAYER MATRIX WOUND DRESSING 4 IN*5 IN (10 CM*12.5 CM)
Type: IMPLANTABLE DEVICE | Site: FOOT | Status: FUNCTIONAL
Brand: INTEGRA®

## 2019-08-09 RX ORDER — FENTANYL CITRATE/PF 50 MCG/ML
50 SYRINGE (ML) INJECTION
Status: DISCONTINUED | OUTPATIENT
Start: 2019-08-09 | End: 2019-08-09 | Stop reason: HOSPADM

## 2019-08-09 RX ORDER — MIDAZOLAM HYDROCHLORIDE 1 MG/ML
INJECTION INTRAMUSCULAR; INTRAVENOUS AS NEEDED
Status: DISCONTINUED | OUTPATIENT
Start: 2019-08-09 | End: 2019-08-09 | Stop reason: SURG

## 2019-08-09 RX ORDER — MEPERIDINE HYDROCHLORIDE 50 MG/ML
12.5 INJECTION INTRAMUSCULAR; INTRAVENOUS; SUBCUTANEOUS ONCE AS NEEDED
Status: DISCONTINUED | OUTPATIENT
Start: 2019-08-09 | End: 2019-08-09 | Stop reason: HOSPADM

## 2019-08-09 RX ORDER — LIDOCAINE HYDROCHLORIDE 20 MG/ML
INJECTION, SOLUTION EPIDURAL; INFILTRATION; INTRACAUDAL; PERINEURAL AS NEEDED
Status: DISCONTINUED | OUTPATIENT
Start: 2019-08-09 | End: 2019-08-09 | Stop reason: SURG

## 2019-08-09 RX ORDER — DEXAMETHASONE SODIUM PHOSPHATE 4 MG/ML
INJECTION, SOLUTION INTRA-ARTICULAR; INTRALESIONAL; INTRAMUSCULAR; INTRAVENOUS; SOFT TISSUE AS NEEDED
Status: DISCONTINUED | OUTPATIENT
Start: 2019-08-09 | End: 2019-08-09 | Stop reason: SURG

## 2019-08-09 RX ORDER — FENTANYL CITRATE 50 UG/ML
INJECTION, SOLUTION INTRAMUSCULAR; INTRAVENOUS AS NEEDED
Status: DISCONTINUED | OUTPATIENT
Start: 2019-08-09 | End: 2019-08-09 | Stop reason: SURG

## 2019-08-09 RX ORDER — ONDANSETRON 2 MG/ML
4 INJECTION INTRAMUSCULAR; INTRAVENOUS ONCE AS NEEDED
Status: DISCONTINUED | OUTPATIENT
Start: 2019-08-09 | End: 2019-08-09 | Stop reason: HOSPADM

## 2019-08-09 RX ORDER — EPHEDRINE SULFATE 50 MG/ML
INJECTION INTRAVENOUS AS NEEDED
Status: DISCONTINUED | OUTPATIENT
Start: 2019-08-09 | End: 2019-08-09 | Stop reason: SURG

## 2019-08-09 RX ORDER — SODIUM CHLORIDE 9 MG/ML
125 INJECTION, SOLUTION INTRAVENOUS CONTINUOUS
Status: DISCONTINUED | OUTPATIENT
Start: 2019-08-09 | End: 2019-08-09 | Stop reason: HOSPADM

## 2019-08-09 RX ORDER — PROPOFOL 10 MG/ML
INJECTION, EMULSION INTRAVENOUS AS NEEDED
Status: DISCONTINUED | OUTPATIENT
Start: 2019-08-09 | End: 2019-08-09 | Stop reason: SURG

## 2019-08-09 RX ORDER — MAGNESIUM HYDROXIDE 1200 MG/15ML
LIQUID ORAL AS NEEDED
Status: DISCONTINUED | OUTPATIENT
Start: 2019-08-09 | End: 2019-08-09 | Stop reason: HOSPADM

## 2019-08-09 RX ORDER — OXYCODONE HYDROCHLORIDE AND ACETAMINOPHEN 5; 325 MG/1; MG/1
1 TABLET ORAL EVERY 4 HOURS PRN
Status: DISCONTINUED | OUTPATIENT
Start: 2019-08-09 | End: 2019-08-09 | Stop reason: HOSPADM

## 2019-08-09 RX ORDER — HYDROMORPHONE HCL/PF 1 MG/ML
0.5 SYRINGE (ML) INJECTION
Status: DISCONTINUED | OUTPATIENT
Start: 2019-08-09 | End: 2019-08-09 | Stop reason: HOSPADM

## 2019-08-09 RX ORDER — ONDANSETRON 2 MG/ML
INJECTION INTRAMUSCULAR; INTRAVENOUS AS NEEDED
Status: DISCONTINUED | OUTPATIENT
Start: 2019-08-09 | End: 2019-08-09 | Stop reason: SURG

## 2019-08-09 RX ADMIN — HYDROMORPHONE HYDROCHLORIDE 0.5 MG: 1 INJECTION, SOLUTION INTRAMUSCULAR; INTRAVENOUS; SUBCUTANEOUS at 14:54

## 2019-08-09 RX ADMIN — MIDAZOLAM 4 MG: 1 INJECTION INTRAMUSCULAR; INTRAVENOUS at 12:50

## 2019-08-09 RX ADMIN — PROPOFOL 100 MG: 10 INJECTION, EMULSION INTRAVENOUS at 12:53

## 2019-08-09 RX ADMIN — OXYCODONE HYDROCHLORIDE AND ACETAMINOPHEN 1 TABLET: 5; 325 TABLET ORAL at 16:15

## 2019-08-09 RX ADMIN — FENTANYL CITRATE 50 MCG: 50 INJECTION, SOLUTION INTRAMUSCULAR; INTRAVENOUS at 14:31

## 2019-08-09 RX ADMIN — FENTANYL CITRATE 50 MCG: 50 INJECTION, SOLUTION INTRAMUSCULAR; INTRAVENOUS at 13:56

## 2019-08-09 RX ADMIN — EPHEDRINE SULFATE 5 MG: 50 INJECTION, SOLUTION INTRAVENOUS at 13:41

## 2019-08-09 RX ADMIN — FENTANYL CITRATE 50 MCG: 50 INJECTION, SOLUTION INTRAMUSCULAR; INTRAVENOUS at 12:53

## 2019-08-09 RX ADMIN — LIDOCAINE HYDROCHLORIDE 100 MG: 20 INJECTION, SOLUTION EPIDURAL; INFILTRATION; INTRACAUDAL; PERINEURAL at 12:52

## 2019-08-09 RX ADMIN — DEXAMETHASONE SODIUM PHOSPHATE 6 MG: 4 INJECTION, SOLUTION INTRAMUSCULAR; INTRAVENOUS at 13:36

## 2019-08-09 RX ADMIN — FENTANYL CITRATE 50 MCG: 50 INJECTION, SOLUTION INTRAMUSCULAR; INTRAVENOUS at 13:15

## 2019-08-09 RX ADMIN — ONDANSETRON 4 MG: 2 INJECTION INTRAMUSCULAR; INTRAVENOUS at 14:06

## 2019-08-09 RX ADMIN — SODIUM CHLORIDE: 0.9 INJECTION, SOLUTION INTRAVENOUS at 13:39

## 2019-08-09 RX ADMIN — HYDROMORPHONE HYDROCHLORIDE 0.5 MG: 1 INJECTION, SOLUTION INTRAMUSCULAR; INTRAVENOUS; SUBCUTANEOUS at 15:08

## 2019-08-09 RX ADMIN — FENTANYL CITRATE 50 MCG: 50 INJECTION, SOLUTION INTRAMUSCULAR; INTRAVENOUS at 14:40

## 2019-08-09 RX ADMIN — EPHEDRINE SULFATE 10 MG: 50 INJECTION, SOLUTION INTRAVENOUS at 13:32

## 2019-08-09 RX ADMIN — CEFAZOLIN SODIUM 3000 MG: 1 INJECTION, POWDER, FOR SOLUTION INTRAMUSCULAR; INTRAVENOUS at 12:45

## 2019-08-09 RX ADMIN — PROPOFOL 300 MG: 10 INJECTION, EMULSION INTRAVENOUS at 12:52

## 2019-08-09 RX ADMIN — SODIUM CHLORIDE 125 ML/HR: 0.9 INJECTION, SOLUTION INTRAVENOUS at 12:25

## 2019-08-09 RX ADMIN — FENTANYL CITRATE 50 MCG: 50 INJECTION, SOLUTION INTRAMUSCULAR; INTRAVENOUS at 13:52

## 2019-08-09 NOTE — INTERVAL H&P NOTE
H&P reviewed  After examining the patient I find no changes in the patients condition since the H&P had been written    /90   Pulse 79   Temp 98 1 °F (36 7 °C) (Tympanic)   Resp 16   Ht 6' 3" (1 905 m)   Wt 136 kg (300 lb)   SpO2 97%   BMI 37 50 kg/m²

## 2019-08-09 NOTE — OP NOTE
OPERATIVE REPORT - Podiatry  PATIENT NAME: Deirdre Garcia    :  1975  MRN: [id number]  Pt Location: [facility] OR ROOM 05    SURGERY DATE: 2019    Surgeon(s) and Role:     * Siddhartha Suh DPM - Primary     * Perley Hamman, DPM - Assisting    Pre-op Diagnosis:  Soft tissue injury of foot, unspecified laterality, initial encounter [O93 482U]    Post-Op Diagnosis Codes: * Soft tissue injury of foot, unspecified laterality, initial encounter [G19 [id number]]    Procedure(s) (LRB):  WASHOUT debridement  removal of integra graft, application of new integra graft (Bilateral)   CPT code:  78760  : surgical preparation or creation of recipient site by excision of open wounds, burn eschar, or scar including subcutaneous tissues, or incisional release of scar contracture, legs; 1st 100 sq or 1% of body area of infant 6 children    Specimen(s):  * No specimens in log *    Estimated Blood Loss:   Minimal    Drains:  * No LDAs found *    Anesthesia Type:   Choice     Hemostasis:  Anatomic dissection    Materials:  One 4 x 5 in Integra bilayer matrix wound dressing, 3-0 nylon    Operative Findings:  I and D bilateral foot wounds, with application of Integra graft to right foot wounds x2 and left foot wound x1, left heel wound is healed    Complications:   None    Procedure and Technique:     Under mild sedation, the patient was brought into the operating room and placed on the operating room table in the supine position  Pneumatic tourniquets were applied to bilateral calf however not inflated  A time out was performed to confirm the correct patient, procedure and site with all parties in agreement  At this time using a staple remover, all staples from bilateral feet were removed along with remaining Integra graft  The feet were then scrubbed, prepped and draped in the usual aseptic manner  The feet were placed on the operating room table      At this time using a sterile curette attention was directed to the right foot   The medial heel wound was debrided down to subcutaneous tissue and granular wound base using a sterile curette, removing less than 30 centimeter squared of fibrotic tissue and remaining Integra graft to wound measuring 8 x 4 x 0 2 cm  Attention was then directed to the right medial 1st metatarsal joint using a sterile curette this wound was debrided down to subcutaneous tissue and granular wound base removing less than 20 centimeter squared of fibrotic tissue remaining Integra graft to wound measuring 5 x 4 x 0 2 cm  At this time direct did attention to the left foot wound at the medial 1st MPJ  This wound was debrided down to subcutaneous tissue granular wound base using a sterile curette removing less than 40 centimeter squared of fibrotic tissue and remaining Integra graft to wound measuring 8 x 5 x 0 2cm  At this time using a total of 4 x 5 in Integra bilayer matrix wound dressing to wounds of the right foot and 1 wound to the left foot was applied Integra graft which was secured using 3-0 nylon suture  The wounds were dressed using Adaptic, DSD, Ace wrap                    The patient tolerated the procedure and anesthesia well and was transported to the PACU with vital signs stable  Dr Henrique Perkins was present during the entire procedure and participated in all tony aspects  Alyssa Peralta DPM  DATE: August 9, 2019  TIME: 2:16 PM      Portions of the record may have been created with voice recognition software  Occasional wrong word or "sound a like" substitutions may have occurred due to the inherent limitations of voice recognition software  Read the chart carefully and recognize, using context, where substitutions have occurred

## 2019-08-09 NOTE — ANESTHESIA PREPROCEDURE EVALUATION
Review of Systems/Medical History  Patient summary reviewed  Chart reviewed  No history of anesthetic complications     Cardiovascular  Hyperlipidemia, Hypertension ,    Pulmonary  Sleep apnea CPAP,   Comment: Non-compliant     GI/Hepatic    GERD , No liver disease ,        Negative  ROS        Endo/Other    Obesity    GYN       Hematology  Negative hematology ROS      Musculoskeletal  Negative musculoskeletal ROS        Neurology  Negative neurology ROS      Psychology   Anxiety, Depression ,                     Anesthesia Plan  ASA Score- 2     Anesthesia Type- general with ASA Monitors  Additional Monitors:   Airway Plan: ETT and LMA  Plan Factors-    Induction- intravenous  Postoperative Plan- Plan for postoperative opioid use  Informed Consent- Anesthetic plan and risks discussed with patient and spouse

## 2019-08-09 NOTE — DISCHARGE INSTRUCTIONS
Post-Operative Instructions    1  Take your prescribed medication as directed  2  Upon arrival at home, lie down and elevate your surgical foot on 2 pillows  3  Remain quiet, off your feet as much as possible, for the first 24-48 hours  This is when your feet first swell and may become painful  After 48 hours you may begin limited walking following these restrictions:   Nonweightbearinbg to surgical foot right foot, weight bearing as tolerated left heel in [facility] tooe offloading shoe  4  Drink large quantities of water  Consume no alcohol  Continue a well-balanced diet  5  Report any unusual discomfort or fever to this office  6  A limited amount of discomfort and swelling is to be expected  In some cases the skin may take on a bruised appearance  The surgical solution that was applied to your foot prior to the operation is dark in color and the operation site may appear to be oozing when it actually is not  7  A slight amount of blood is to be expected, and is no cause for alarm  Do not remove the dressings  If there is active bleeding and if the bleeding persists, add additional gauze to the bandage, apply direct pressure, elevate your feet and call this office  8  Do not get the dressings wet  As regular bathing may be inconvenient, sponge baths are recommended  9  When anesthesia wears off and if any discomfort should be present, apply an ice pack directly over the operated area for 15 minute intervals for several hours or until the pain leaves  (USE IN EXCESS OF 15 MINUTES COULD CAUSE FROSTBITE)  Do not use hot water bags or electric pads  A convenient icepack can be made by placing ice cubes in a plastic bag and covering this with a towel  10  If necessary, take a mild laxative before retiring  11  Leave dressings clean, dry, intact  12  Having performed the operation, we are interested in a prompt recovery  Please cooperate by following the above instructions    13  Please call to confirm your post-op appointment or call with any other questions

## 2019-08-09 NOTE — DISCHARGE SUMMARY
Discharge Summary Outpatient Procedure Podiatry -   Analy Akhtar 44 y o  male MRN: [id number]  Unit/Bed#: OR [place] Encounter: [id number]    Admission Date: 8/9/2019     Admitting Diagnosis: Soft tissue injury of foot, unspecified laterality, initial encounter [S99 929A]    Discharge Diagnosis: same    Procedures Performed: WASHOUT debridement  removal of integra graft, application of new integra graft: 49693 (CPT®)    Complications: none    Condition at Discharge: stable    Discharge instructions/Information to patient and family:   See after visit summary for information provided to patient and family  Provisions for Follow-Up Care/Important appointments:  See after visit summary for information related to follow-up care and any pertinent home health orders  Discharge Medications:  See after visit summary for reconciled discharge medications provided to patient and family

## 2019-08-09 NOTE — ANESTHESIA POSTPROCEDURE EVALUATION
Post-Op Assessment Note    CV Status:  Stable    Pain management: adequate     Mental Status:  Alert and awake   Hydration Status:  Euvolemic   PONV Controlled:  Controlled   Airway Patency:  Patent   Post Op Vitals Reviewed: Yes      Staff: Anesthesiologist           BP      Temp      Pulse 90 (08/09/19 1508)   Resp 16 (08/09/19 1508)    SpO2 95 % (08/09/19 1508)

## 2019-08-12 ENCOUNTER — TELEPHONE (OUTPATIENT)
Dept: PODIATRY | Facility: CLINIC | Age: 44
End: 2019-08-12

## 2019-08-12 NOTE — TELEPHONE ENCOUNTER
Wife called in regard to 's wound care orders  She wanted to know if the [facility] should keep their appointment for tomorrow or if they should not have the nurse come, and wait for their appointment with Dr Cassie Paige on Thursday  After speaking with Dr Cassie Paige he advised for the patient to keep their appointment with the VNA and to continue with the original wound care orders and have the nurse change the dressings according to the orders

## 2019-08-15 ENCOUNTER — OFFICE VISIT (OUTPATIENT)
Dept: PODIATRY | Facility: CLINIC | Age: 44
End: 2019-08-15
Payer: COMMERCIAL

## 2019-08-15 VITALS
HEIGHT: 75 IN | SYSTOLIC BLOOD PRESSURE: 152 MMHG | BODY MASS INDEX: 37.3 KG/M2 | WEIGHT: 300 LBS | DIASTOLIC BLOOD PRESSURE: 88 MMHG | HEART RATE: 84 BPM

## 2019-08-15 DIAGNOSIS — S99.922D: Primary | ICD-10-CM

## 2019-08-15 DIAGNOSIS — S99.921A SOFT TISSUE INJURY OF RIGHT FOOT, INITIAL ENCOUNTER: ICD-10-CM

## 2019-08-15 PROCEDURE — 99213 OFFICE O/P EST LOW 20 MIN: CPT | Performed by: PODIATRIST

## 2019-08-15 RX ORDER — ALPRAZOLAM 0.5 MG/1
0.5 TABLET ORAL
COMMUNITY
Start: 2019-07-31 | End: 2021-07-20 | Stop reason: SDUPTHER
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
## 2019-08-20 NOTE — PATIENT INSTRUCTIONS
Crutch Instructions   WHAT YOU NEED TO KNOW:   Crutches are tools that provide support and balance when you walk  You may need 1 or 2 crutches to help support your body weight  You may need crutches if you had surgery or an injury that affects your ability to walk  DISCHARGE INSTRUCTIONS:   How to use crutches safely:   · Support your weight with your arms and hands  Do not support your weight with your armpits  This could hurt the nerves that are in your underarms  Keep your elbow bent when the crutch is in place under your arm  · Walk slowly and carefully with crutches  Go up and down stairs and ramps slowly, and stop to rest when you feel tired  Get up slowly to a sitting or standing position  This will help prevent dizziness and fainting  Use your crutches only on firm ground  Use caution when you walk on ice or snow  Wet or waxed floors and smooth cement floors can be slippery  Watch out for small rugs or cords  How to walk with crutches:   · Place both crutches under your arms, and place your hands on the hand  of the crutches  Place your crutches slightly in front of you  · The top of the crutches should be about 2 fingers [id number] (about 1½ inches) below your armpits  Place your weight on your hands  The top of the crutches should not press into your armpits  · If you have one leg that is injured, keep it off the floor by bending your knee  · Lift the crutches and move them a step ahead of you  Put the rubber ends of the crutches firmly on the ground  Move the foot that is not injured between the crutches  Place that heel down first     · If you are using your crutches for balance, move your right foot and left crutch forward  Then move your left foot and right crutch forward  Keep walking this way  How to go up stairs with crutches:   · Face the stairs  Put the crutches close to the first step  · Push onto the crutches and put your uninjured leg on the first step      · Put your weight on your uninjured leg that is on the first step  Bring both crutches and the injured leg onto the step at the same time  · When you hold onto a railing with one arm, put both crutches under the other arm  Use the railing to help you go up stairs  How to go down stairs with crutches:   · Stand with the toes of your uninjured leg close to the edge of the step  · Bend the knee of your uninjured leg  Slowly lower both crutches along with the injured leg onto the next step  · Lean on your crutches  Slowly lower your uninjured leg onto the same step  · Place both crutches under one arm while you hold onto the railing with the other arm  How to sit in a chair with crutches:   · Turn and back up to the chair until you feel the edge of it against the back of your legs  Keep your injured leg forward  · Take your crutches out from under your arms  Sit while bending your uninjured knee  How to get up from a chair with crutches:   · Sit on the edge of your chair  · Push up with your hands using the crutches or arms of the chair  Put your weight on your uninjured foot as you get up  · Keep your injured leg bent at the knee and off the floor  Contact your healthcare provider if:   · Your crutches do not fit  · One crutch is longer than the other  · Your crutches break or get lost     · The rubber tips of your crutches are split or loose  · You get blisters or painful calluses on your hands or armpits  · Your armpit is red, sore, or has bumps or pimples  · Your arm muscles get weaker the longer you use the crutches  · You have questions or concerns about your condition or care  Return to the emergency department if:   · You have sudden numbness in a hand or arm  · Your fingers feel cold or have cramping pain    © 2017 [employer] Information is for End User's use only and may not be sold, redistributed or otherwise used for commercial purposes  All illustrations and images included in CareNotes® are the copyrighted property of A D A M [employer]  or Eric Diez  The above information is an  only  It is not intended as medical advice for individual conditions or treatments  Talk to your doctor, nurse or pharmacist before following any medical regimen to see if it is safe and effective for you

## 2019-08-29 ENCOUNTER — OFFICE VISIT (OUTPATIENT)
Dept: PODIATRY | Facility: CLINIC | Age: 44
End: 2019-08-29
Payer: COMMERCIAL

## 2019-08-29 VITALS — HEIGHT: 75 IN | BODY MASS INDEX: 37.3 KG/M2 | WEIGHT: 300 LBS

## 2019-08-29 DIAGNOSIS — S99.921A SOFT TISSUE INJURY OF RIGHT FOOT, INITIAL ENCOUNTER: ICD-10-CM

## 2019-08-29 DIAGNOSIS — S99.922D: Primary | ICD-10-CM

## 2019-08-29 PROCEDURE — 99213 OFFICE O/P EST LOW 20 MIN: CPT | Performed by: PODIATRIST

## 2019-08-31 PROBLEM — S99.929A SOFT TISSUE INJURY OF FOOT: Status: RESOLVED | Noted: 2019-07-24 | Resolved: 2019-08-31
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
## 2019-09-09 ENCOUNTER — TELEPHONE (OUTPATIENT)
Dept: PODIATRY | Facility: CLINIC | Age: 44
End: 2019-09-09

## 2019-09-09 NOTE — TELEPHONE ENCOUNTER
Shahid Geiger left a message on our voicemail  His wife changed his bandages and 3 stitches popped open  He said in the message that he just wanted to let Dr Duenas Hamper know and he will see him in the morning  I called him back but it went to his voicemail

## 2019-09-10 ENCOUNTER — OFFICE VISIT (OUTPATIENT)
Dept: PODIATRY | Facility: CLINIC | Age: 44
End: 2019-09-10

## 2019-09-10 VITALS
DIASTOLIC BLOOD PRESSURE: 79 MMHG | WEIGHT: 299 LBS | BODY MASS INDEX: 37.18 KG/M2 | SYSTOLIC BLOOD PRESSURE: 121 MMHG | HEIGHT: 75 IN | HEART RATE: 90 BPM

## 2019-09-10 DIAGNOSIS — S99.922D: Primary | ICD-10-CM

## 2019-09-10 DIAGNOSIS — S99.921A SOFT TISSUE INJURY OF RIGHT FOOT, INITIAL ENCOUNTER: ICD-10-CM

## 2019-09-10 PROCEDURE — 99024 POSTOP FOLLOW-UP VISIT: CPT | Performed by: PODIATRIST

## 2019-09-10 NOTE — PROGRESS NOTES
This patient was seen on 9/10/19  Assessment:    Problem List Items Addressed This Visit        Other    Soft tissue injury of left foot - Primary    Relevant Orders    Walker    Soft tissue injury of right foot          Plan:  1  I removed the sutures and Integra outer layer from the RIght heel lesion noting two small, viable superficial wounds  90% of the original wound is healed  Dermagran applied and to be changed q3 days  He may now ambulate on both heels in Darco Wedge shoes  A second shoe was dispensed today  2  The forefoot Integra grafts were left intact bilaterally, The Left has a couple of disrupted sutures so we reinforced it with Steri-Strips  Continue xeroform and DSD q3 days  3  Follow-up for further care x 2 weeks     Diagnoses and all orders for this visit:    Soft tissue injury of left foot, subsequent encounter  -     Walker    Soft tissue injury of right foot, initial encounter          Subjective:      Patient ID: Virginia Mia is a 44 y o  male  Gerry Peak is here post-op second washout and reapplication of Integra grafts  He is now PWB on the Left heel  He is in good spirits and denies pain  The following portions of the patient's history were reviewed and updated as appropriate: allergies, current medications, past family history, past medical history, past social history, past surgical history and problem list     Review of Systems   Constitutional: Negative  Objective:      /79   Pulse 90   Ht 6' 3" (1 905 m)   Wt 136 kg (299 lb)   BMI 37 37 kg/m²          Physical Exam   Constitutional: He appears well-developed and well-nourished  No distress  Skin: He is not diaphoretic  Nursing note and vitals reviewed

## 2019-09-25 ENCOUNTER — OFFICE VISIT (OUTPATIENT)
Dept: PODIATRY | Facility: CLINIC | Age: 44
End: 2019-09-25
Payer: COMMERCIAL

## 2019-09-25 VITALS
WEIGHT: 299 LBS | HEART RATE: 80 BPM | BODY MASS INDEX: 37.18 KG/M2 | DIASTOLIC BLOOD PRESSURE: 90 MMHG | HEIGHT: 75 IN | SYSTOLIC BLOOD PRESSURE: 152 MMHG

## 2019-09-25 DIAGNOSIS — S99.921A SOFT TISSUE INJURY OF RIGHT FOOT, INITIAL ENCOUNTER: Primary | ICD-10-CM

## 2019-09-25 DIAGNOSIS — S99.922D: ICD-10-CM

## 2019-09-25 PROCEDURE — 99213 OFFICE O/P EST LOW 20 MIN: CPT | Performed by: PODIATRIST

## 2019-09-26 ENCOUNTER — TELEPHONE (OUTPATIENT)
Dept: PODIATRY | Facility: CLINIC | Age: 44
End: 2019-09-26

## 2019-09-26 NOTE — PROGRESS NOTES
This patient was seen on 9/25/19  Assessment:    Problem List Items Addressed This Visit        Other    Soft tissue injury of left foot    Soft tissue injury of right foot - Primary          Plan:  1  The integra silicone layer and sutures were removed from the Left forefoot site - start [place] [address]   2  The Katya Lazaromering was left intact on the Right forefoot - continue adaptic and DSD  3  The Right heel is almost healed - continue Dermagran  4  The Left heel remains healed  5  Patient may continue WB on Left heel  6  Patient may get Left foot wet in shower, etc      Diagnoses and all orders for this visit:    Soft tissue injury of right foot, initial encounter    Soft tissue injury of left foot, subsequent encounter          Subjective:      Patient ID: Emmanuel Nguyen is a 44 y o  male  Pt arrives for follow up and suture removal on the left foot  Pt presents with dressings intact and wearing bilateral wedge shoes and using a walker  Pt is transported and accompanied by his wife who is completing the dressing changes  Pt continues to be unwilling/unable to look at his feet and complete dressing changes on own  Pt states he thinks he would pass out if he looks at the wounds  Wife states pt does not want to see any breakthrough drainage on the dressings therefore she has been changing the outer dressings more frequently         The following portions of the patient's history were reviewed and updated as appropriate: allergies, current medications, past family history, past medical history, past social history, past surgical history and problem list     Review of Systems      Objective:      /90   Pulse 80   Ht 6' 3" (1 905 m) Comment: verbal  Wt 136 kg (299 lb) Comment: verbal, scale unavail  BMI 37 37 kg/m²          Physical Exam      Wound # 1   Location: right medial foot  Length 2cm: Width 2cm: Depth 0cm:   Deepest Tissue Noted in Base: integra intact  Probe to Bone?: no  Peripheral Skin Description: intact  Granulation: 0% Fibrotic Tissue: 0% Necrotic Tissue: 0%  Drainage Amount: minimal  Signs of Infection: no  Total debrided 0 square centimeters        Wound # 2   Location: left medial foot  Length 6cm: Width 2 5cm: Depth 0 2cm:   Deepest Tissue Noted in Base: sq  Probe to Bone?: no  Peripheral Skin Description: intact  Granulation: 90% Fibrotic Tissue: 10% Necrotic Tissue: 0%  Drainage Amount: minimal  Signs of Infection: no  Total debrided 0 square centimeters        Wound # 3   Location: right heel  Length 1cm: Width 1cm: Depth 0 2cm:   Deepest Tissue Noted in Base: SQ  Probe to Bone?: no  Peripheral Skin Description: intact  Granulation: 90% Fibrotic Tissue: 10% Necrotic Tissue: 0%  Drainage Amount: minimal  Signs of Infection: no  Total debrided 0 square centimeters

## 2019-10-03 ENCOUNTER — TELEPHONE (OUTPATIENT)
Dept: PODIATRY | Facility: CLINIC | Age: 44
End: 2019-10-03

## 2019-10-09 ENCOUNTER — OFFICE VISIT (OUTPATIENT)
Dept: PODIATRY | Facility: CLINIC | Age: 44
End: 2019-10-09
Payer: COMMERCIAL

## 2019-10-09 VITALS
DIASTOLIC BLOOD PRESSURE: 75 MMHG | WEIGHT: 300 LBS | BODY MASS INDEX: 37.3 KG/M2 | HEART RATE: 70 BPM | SYSTOLIC BLOOD PRESSURE: 173 MMHG | HEIGHT: 75 IN

## 2019-10-09 DIAGNOSIS — S99.922D: ICD-10-CM

## 2019-10-09 DIAGNOSIS — S99.921A SOFT TISSUE INJURY OF RIGHT FOOT, INITIAL ENCOUNTER: Primary | ICD-10-CM

## 2019-10-09 PROCEDURE — 99213 OFFICE O/P EST LOW 20 MIN: CPT | Performed by: PODIATRIST

## 2019-10-09 NOTE — PROGRESS NOTES
This patient was seen on 10/9/19  Assessment:    Problem List Items Addressed This Visit        Other    Soft tissue injury of left foot    Soft tissue injury of right foot - Primary          Plan:      Wound dressing technique and frequency described to patient  I am to be called if any signs of infection develop such as erythema, increased wound size or significant change in appearance of wound, odor, pain, increased or change in color of drainage, swelling, fever, chills, nightsweats  I reviewed these signs with the patient  I recommended limiting WB to bathroom priveleges and meal table and to use any prescribed offloading devices in an effort to allow proper rest and healing of the wounded area  I explained that good wound care and compliance are necessary to allow healing and to prevent toe loss or limb loss  Dermagran gauze applied to all open areas, covered with abd and secured with kerlex  Stockinette and ace wraps applied  To be changed every other day  Pt may shower  Bilateral surgical shoes dispensed  He may stop using walker  He may shower with mild soap and water daily  Diagnoses and all orders for this visit:    Soft tissue injury of right foot, initial encounter    Soft tissue injury of left foot, subsequent encounter          Subjective:      Patient ID: Alma Rosa Arce is a 44 y o  male  Pt arrives for wound care follow up  Presents with dressings intact  Pt's wife is changing the dressing as ordered  Pt and wife deny problems  Wearing bilateral wedge shoes and using a walker  The following portions of the patient's history were reviewed and updated as appropriate: allergies, current medications, past family history, past medical history, past social history, past surgical history and problem list     Review of Systems   Constitutional: Negative  Respiratory: Negative  Cardiovascular: Negative  Gastrointestinal: Negative  Musculoskeletal: Negative      Skin: Positive for wound  Neurological: Negative  Hematological: Negative  Psychiatric/Behavioral: Negative  Objective:      BP (!) 173/75   Pulse 70   Ht 6' 3" (1 905 m)   Wt 136 kg (300 lb)   BMI 37 50 kg/m²          Physical Exam   Constitutional: He is oriented to person, place, and time  He appears well-developed and well-nourished  No distress  HENT:   Head: Normocephalic  Eyes: Pupils are equal, round, and reactive to light  Pulmonary/Chest: Effort normal and breath sounds normal    Abdominal: Soft  Bowel sounds are normal    Musculoskeletal: Normal range of motion  Neurological: He is alert and oriented to person, place, and time  Skin: Skin is warm  Capillary refill takes less than 2 seconds  He is not diaphoretic  Psychiatric: He has a normal mood and affect  Nursing note and vitals reviewed          Wound # 1   Location: right medial foot  Length 4cm: Width 2cm: Depth 0 2cm:   Deepest Tissue Noted in Base: sq  Probe to Bone?: no  Peripheral Skin Description: intact  Granulation: 90% Fibrotic Tissue: 10% Necrotic Tissue: 0%  Drainage Amount: minimal  Signs of Infection: no  Total debrided 8 square centimeters        Wound # 2   Location: left medial foot  Length 5cm: Width 1 5cm: Depth 0 2cm:   Deepest Tissue Noted in Base: SQ  Probe to Bone?: no  Peripheral Skin Description: intact  Granulation: 90% Fibrotic Tissue: 10% Necrotic Tissue: 0%  Drainage Amount: minimal  Signs of Infection: no  Total debrided 7 5 square centimeters        Wound # 3  Location: right heel  Length 0 5cm: Width 0 4cm: Depth 0cm:   Deepest Tissue Noted in Base:unstageable  Probe to Bone?: no  Peripheral Skin Description: intact  Granulation: 0% Fibrotic Tissue: 0% Necrotic Tissue: 100%  Drainage Amount: none  Signs of Infection: no  Total debrided 0 square centimeters

## 2019-10-23 ENCOUNTER — OFFICE VISIT (OUTPATIENT)
Dept: PODIATRY | Facility: CLINIC | Age: 44
End: 2019-10-23
Payer: COMMERCIAL

## 2019-10-23 VITALS
HEIGHT: 75 IN | DIASTOLIC BLOOD PRESSURE: 88 MMHG | HEART RATE: 80 BPM | SYSTOLIC BLOOD PRESSURE: 156 MMHG | BODY MASS INDEX: 37.38 KG/M2 | WEIGHT: 300.6 LBS

## 2019-10-23 DIAGNOSIS — S99.921A SOFT TISSUE INJURY OF RIGHT FOOT, INITIAL ENCOUNTER: Primary | ICD-10-CM

## 2019-10-23 DIAGNOSIS — S99.922D: ICD-10-CM

## 2019-10-23 PROCEDURE — 99213 OFFICE O/P EST LOW 20 MIN: CPT | Performed by: PODIATRIST

## 2019-10-23 NOTE — PROGRESS NOTES
This patient was seen on 10/23/19  Assessment:    Problem List Items Addressed This Visit     None          Plan:   Dermagran gauze applied to open areas, covered with gauze and tape, meagan to secure  Pt's wife to change every other day  There are no diagnoses linked to this encounter  Subjective:      Patient ID: Alva Deleon is a 44 y o  male  Pt arrives for wound care follow up  Presents with dressings intact  Pt's wife is changing the dressings as ordered  The following portions of the patient's history were reviewed and updated as appropriate: allergies, current medications, past family history, past medical history, past social history, past surgical history and problem list     Review of Systems   Constitutional: Negative  Respiratory: Negative  Cardiovascular: Negative  Gastrointestinal: Negative  Musculoskeletal: Negative  Skin: Positive for wound  Neurological: Negative  Hematological: Negative  Psychiatric/Behavioral: Negative  Objective:      /88   Pulse 80   Ht 6' 3" (1 905 m) Comment: verbal  Wt (!) 136 kg (300 lb 9 6 oz)   BMI 37 57 kg/m²          Physical Exam   Constitutional: He is oriented to person, place, and time  He appears well-developed and well-nourished  No distress  HENT:   Head: Normocephalic  Eyes: Pupils are equal, round, and reactive to light  Pulmonary/Chest: Effort normal and breath sounds normal    Abdominal: Soft  Bowel sounds are normal    Musculoskeletal: Normal range of motion  Neurological: He is alert and oriented to person, place, and time  Skin: Skin is warm  Capillary refill takes less than 2 seconds  He is not diaphoretic  Psychiatric: He has a normal mood and affect  Nursing note and vitals reviewed          Wound # 1   Location: right med foot  Length 2 6cm: Width 1 7cm: Depth 0 2cm:   Deepest Tissue Noted in Base: SQ  Probe to Bone?: no  Peripheral Skin Description: intact  Granulation: 90% Fibrotic Tissue: 10% Necrotic Tissue: 0%  Drainage Amount: minimal  Signs of Infection: no  Total debrided 0 square centimeters    Wound # 2  Location: left med foot  Length 2 5cm: Width 1 5cm: Depth 0 2cm:   Deepest Tissue Noted in Base: SQ  Probe to Bone?: no  Peripheral Skin Description: intact  Granulation: 90% Fibrotic Tissue: 10% Necrotic Tissue: 0%  Drainage Amount: minimal  Signs of Infection: no  Total debrided 0 square centimeters    Wound # 3   Location: right heel HEALED  Length 0cm: Width 0cm: Depth 0cm:   Deepest Tissue Noted in Base: Skin  Probe to Bone?: No  Peripheral Skin Description: Skin  Granulation: 0% Fibrotic Tissue: 0% Necrotic Tissue: 0%  Drainage Amount: 0  Signs of Infection: No  Total debrided 0 square centimeters

## 2019-11-19 ENCOUNTER — PROCEDURE VISIT (OUTPATIENT)
Dept: PODIATRY | Facility: CLINIC | Age: 44
End: 2019-11-19
Payer: COMMERCIAL

## 2019-11-19 VITALS
DIASTOLIC BLOOD PRESSURE: 85 MMHG | HEIGHT: 75 IN | BODY MASS INDEX: 37.47 KG/M2 | SYSTOLIC BLOOD PRESSURE: 135 MMHG | HEART RATE: 69 BPM | WEIGHT: 301.4 LBS

## 2019-11-19 DIAGNOSIS — R53.81 PHYSICAL DECONDITIONING: Primary | ICD-10-CM

## 2019-11-19 DIAGNOSIS — S99.922D: ICD-10-CM

## 2019-11-19 DIAGNOSIS — S99.921A SOFT TISSUE INJURY OF RIGHT FOOT, INITIAL ENCOUNTER: ICD-10-CM

## 2019-11-19 PROCEDURE — 99213 OFFICE O/P EST LOW 20 MIN: CPT | Performed by: PODIATRIST

## 2019-11-19 NOTE — PROGRESS NOTES
This patient was seen on 11/19/19    Assessment:    Problem List Items Addressed This Visit        Other    Soft tissue injury of left foot    Relevant Orders    Ambulatory referral to Physical Therapy    Soft tissue injury of right foot    Relevant Orders    Ambulatory referral to Physical Therapy    Physical deconditioning - Primary    Relevant Orders    Ambulatory referral to Physical Therapy          Plan:  Left foot wound- dermagran gauze and cosmopore dressing applied  To be changed every other day  Right foot wound- dermagran gauze and cosmpore dressing applied  To be changed every other day  Continue wearing surgical shoes until wounds are healed; may transition to sneaker when wounds healed  Order for PT     Diagnoses and all orders for this visit:    Physical deconditioning  -     Ambulatory referral to Physical Therapy; Future    Soft tissue injury of right foot, initial encounter  -     Ambulatory referral to Physical Therapy; Future    Soft tissue injury of left foot, subsequent encounter  -     Ambulatory referral to Physical Therapy; Future          Subjective:      Patient ID: Radha Lan is a 38 y o  male  Pt arrives for wound care follow up  Presents with dressing intact, dressings changed as ordered  Pt wearing surgical shoes  The following portions of the patient's history were reviewed and updated as appropriate: allergies, current medications, past family history, past medical history, past social history, past surgical history and problem list     Review of Systems   Constitutional: Negative  Respiratory: Negative  Cardiovascular: Negative  Gastrointestinal: Negative  Musculoskeletal: Negative  Skin: Positive for wound  Neurological: Negative  Hematological: Negative  Psychiatric/Behavioral: Negative            Objective:      /85   Pulse 69   Ht 6' 3" (1 905 m)   Wt (!) 137 kg (301 lb 6 4 oz)   BMI 37 67 kg/m²          Physical Exam Constitutional: He is oriented to person, place, and time  He appears well-developed and well-nourished  No distress  HENT:   Head: Normocephalic  Eyes: Pupils are equal, round, and reactive to light  Pulmonary/Chest: Effort normal and breath sounds normal    Abdominal: Soft  Bowel sounds are normal    Musculoskeletal: Normal range of motion  Neurological: He is alert and oriented to person, place, and time  Skin: Skin is warm  Capillary refill takes less than 2 seconds  He is not diaphoretic  Psychiatric: He has a normal mood and affect  Nursing note and vitals reviewed          Wound # 1   Location: right med foot  Length 0 4cm: Width 0 2cm: Depth 0 2cm:   Deepest Tissue Noted in Base: SQ  Probe to Bone?: no  Peripheral Skin Description: intact  Granulation: 90% Fibrotic Tissue: 10% Necrotic Tissue: 0%  Drainage Amount: minimal  Signs of Infection: no  Total debrided 0 square centimeters     Wound # 2  Location: left med foot  Length 1 8cm: Width 0 4cm: Depth 0 2cm:   Deepest Tissue Noted in Base: SQ  Probe to Bone?: no  Peripheral Skin Description: intact  Granulation: 90% Fibrotic Tissue: 10% Necrotic Tissue: 0%  Drainage Amount: minimal  Signs of Infection: no  Total debrided 0 square centimeters

## 2019-11-29 ENCOUNTER — EVALUATION (OUTPATIENT)
Dept: PHYSICAL THERAPY | Facility: CLINIC | Age: 44
End: 2019-11-29
Payer: COMMERCIAL

## 2019-11-29 DIAGNOSIS — S99.922D: ICD-10-CM

## 2019-11-29 DIAGNOSIS — R53.81 PHYSICAL DECONDITIONING: ICD-10-CM

## 2019-11-29 DIAGNOSIS — S99.921D: ICD-10-CM

## 2019-11-29 PROCEDURE — 97110 THERAPEUTIC EXERCISES: CPT | Performed by: PHYSICAL THERAPIST

## 2019-11-29 PROCEDURE — 97162 PT EVAL MOD COMPLEX 30 MIN: CPT | Performed by: PHYSICAL THERAPIST

## 2019-11-29 PROCEDURE — 97112 NEUROMUSCULAR REEDUCATION: CPT | Performed by: PHYSICAL THERAPIST
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
## 2019-12-02 ENCOUNTER — OFFICE VISIT (OUTPATIENT)
Dept: PHYSICAL THERAPY | Facility: CLINIC | Age: 44
End: 2019-12-02
Payer: COMMERCIAL

## 2019-12-02 DIAGNOSIS — S99.922D: ICD-10-CM

## 2019-12-02 DIAGNOSIS — S99.921D: ICD-10-CM

## 2019-12-02 DIAGNOSIS — R53.81 PHYSICAL DECONDITIONING: Primary | ICD-10-CM

## 2019-12-02 PROCEDURE — 97110 THERAPEUTIC EXERCISES: CPT | Performed by: PHYSICAL THERAPIST

## 2019-12-02 PROCEDURE — 97140 MANUAL THERAPY 1/> REGIONS: CPT | Performed by: PHYSICAL THERAPIST

## 2019-12-02 PROCEDURE — 97112 NEUROMUSCULAR REEDUCATION: CPT | Performed by: PHYSICAL THERAPIST

## 2019-12-02 NOTE — PROGRESS NOTES
Daily Note     Today's date: 2019  Patient name: Sonja Rodrigues  : 1975  MRN: [id number]  Referring provider: Yoni Card DPM  Dx:   Encounter Diagnosis     ICD-10-CM    1  Physical deconditioning R53 81    2  Soft tissue injury of right foot, subsequent encounter S99 923R    3  Soft tissue injury of left foot, subsequent encounter S99 922D                   Subjective: Pt reports good compliance with HEP  He reports good response to resuming light gym activities  Objective: See treatment diary below    Assessment: Pt demonstrated significantly improved B trunk lean, limited B push-off secondary to B 1st MTP hypomobility  Plan: Cont  POC  Precautions:  PMHx: depression, anxiety, HTN, THONG  Dx: deconditioning s/p B medial foot graft x2 July, Aug 2019, WBAT    Manual             B 1st MTP PROM  5 min           B DF PROM, manual GA stretch  5 min                                                      Exercise Diary             Seated B 1st MTP PROM 5"x5 HEP           Supine GA stretch 20"x1            Standing GA stretch  B/  20"x3           SL leg press heel raises             ecc DL heel raises  3x10           DL squatting  0#  1x10  2x15           BioDex SLS EO L=S  60"x1 L=S  60"x1  L10  60"x1           BioDex SLS EC  L=S  30"x1           L piriformis stretch 20"x1 20"x3           clamshells GTB  1x20 GTB  2x20           TM gait training: increase B push-off  2 mph  6 min                                                                                                                                                 Modalities

## 2019-12-10 ENCOUNTER — OFFICE VISIT (OUTPATIENT)
Dept: PHYSICAL THERAPY | Facility: CLINIC | Age: 44
End: 2019-12-10
Payer: COMMERCIAL

## 2019-12-10 DIAGNOSIS — S99.921D: ICD-10-CM

## 2019-12-10 DIAGNOSIS — R53.81 PHYSICAL DECONDITIONING: Primary | ICD-10-CM

## 2019-12-10 DIAGNOSIS — S99.922D: ICD-10-CM

## 2019-12-10 PROCEDURE — 97110 THERAPEUTIC EXERCISES: CPT | Performed by: PHYSICAL THERAPIST

## 2019-12-10 PROCEDURE — 97140 MANUAL THERAPY 1/> REGIONS: CPT | Performed by: PHYSICAL THERAPIST

## 2019-12-10 PROCEDURE — 97112 NEUROMUSCULAR REEDUCATION: CPT | Performed by: PHYSICAL THERAPIST

## 2019-12-10 NOTE — PROGRESS NOTES
Daily Note     Today's date: 12/10/2019  Patient name: Heriberto Luciano  : 1975  MRN: [id number]  Referring provider: Chey Duffy DPM  Dx:   Encounter Diagnosis     ICD-10-CM    1  Physical deconditioning R53 81    2  Soft tissue injury of right foot, subsequent encounter S99 921D    3  Soft tissue injury of left foot, subsequent encounter G98 553R                   Subjective: Pt reports no pain with ambulation, stairs or resuming gym routine  Pt reports mild AM stiffness  Pt requests d/c to HEP  Objective: See treatment diary below  PROM: toe ext: B: 35, DF: R: 10, L: 7 deg  MMT: PF: B: 3+/5  Unable to perform a SL heelraise  Assessment: Pt demonstrated MTP hypomobility and PF weakness  Pt was given an updated HEP and instructions on when to initiate jumping and running  Plan:  D/c to HEP  Precautions:  PMHx: depression, anxiety, HTN, THONG  Dx: deconditioning s/p B medial foot graft x2 July, Aug 2019, WBAT    Manual  11/29 12/2 12/10          B 1st MTP PROM  5 min 5 min          B DF PROM, manual GA stretch  5 min 5 min                                                     Exercise Diary  11/29 12/2 12/10          Seated B 1st MTP PROM 5"x5 HEP 10"x1 ea          Supine GA stretch 20"x1            Standing GA stretch  B/  20"x3 B/ 20"x3          SL leg press heel raises   80#/ 3x10          ecc DL heel raises  3x10 3x10          DL squatting  0#  1x10  2x15 3x15          BioDex SLS EO L=S  60"x1 L=S  60"x1  L10  60"x1 S= 1x60 ea          BioDex SLS EC  L=S  30"x1 S= 1x60" ea          L piriformis stretch 20"x1 20"x3           clamshells GTB  1x20 GTB  2x20           TM gait training: increase B push-off  2 mph  6 min                                                                                                                                                 Modalities

## 2019-12-17 NOTE — PROGRESS NOTES
This patient was seen on 12/18/19    Assessment:    Problem List Items Addressed This Visit        Other    Soft tissue injury of left foot - Primary    Relevant Medications    cephalexin (KEFLEX) 500 mg capsule          Plan:    It doesn't particularly appear infected but colonization is a concern so I will treat with a short course of a first gen  Cephalosporin  Leaving the wound open to the air is a concern, so Dermagran gauze applied to wound bed, covered with gauze and secured with tape  To be changed every other day and return in 4 weeks  Too much activity on the foot is a concern, so Pt may participate in PT but no, pool, treadmill or stepper  May utilize the bicycle  Diagnoses and all orders for this visit:    Soft tissue injury of left foot, subsequent encounter  -     cephalexin (KEFLEX) 500 mg capsule; Take 1 capsule (500 mg total) by mouth every 6 (six) hours for 7 days          Subjective:      Patient ID: Jb Jiang is a 38 y o  male  Pt arrives for wound care follow up  He is wearing regular sneakers and working and going to the gym  He uses the pool, treadmill  He stopped using dressings but has still noted occasional mild serous drainage from the medial distal Left foot  No pain  The following portions of the patient's history were reviewed and updated as appropriate: allergies, current medications, past family history, past medical history, past social history, past surgical history and problem list     Review of Systems   Constitutional: Negative  Respiratory: Negative  Cardiovascular: Negative  Gastrointestinal: Negative  Musculoskeletal: Negative  Skin: Positive for wound  Neurological: Negative  Hematological: Negative  Psychiatric/Behavioral: Negative            Objective:      /96   Pulse 78   Ht 6' 3" (1 905 m) Comment: verbal  Wt (!) 139 kg (305 lb 12 8 oz)   BMI 38 22 kg/m²          Physical Exam   Constitutional: He is oriented to person, place, and time  He appears well-developed and well-nourished  No distress  HENT:   Head: Normocephalic  Eyes: Pupils are equal, round, and reactive to light  Pulmonary/Chest: Effort normal and breath sounds normal    Abdominal: Soft  Bowel sounds are normal    Musculoskeletal: Normal range of motion  Neurological: He is alert and oriented to person, place, and time  Skin: Skin is warm  Capillary refill takes less than 2 seconds  He is not diaphoretic  Psychiatric: He has a normal mood and affect  Nursing note and vitals reviewed          Wound # 1   Location: left medial foot  Length 0 4cm: Width 0 2cm: Depth 0 2cm:   Deepest Tissue Noted in Base: SQ  Probe to Bone?: no  Peripheral Skin Description: intact  Granulation: 90% Fibrotic Tissue: 10% Necrotic Tissue: 0%  Drainage Amount: minimal  Signs of Infection: no  Total debrided 0 08 square centimeters

## 2019-12-18 ENCOUNTER — PROCEDURE VISIT (OUTPATIENT)
Dept: PODIATRY | Facility: CLINIC | Age: 44
End: 2019-12-18
Payer: COMMERCIAL

## 2019-12-18 VITALS
BODY MASS INDEX: 38.02 KG/M2 | HEART RATE: 78 BPM | SYSTOLIC BLOOD PRESSURE: 152 MMHG | HEIGHT: 75 IN | DIASTOLIC BLOOD PRESSURE: 96 MMHG | WEIGHT: 305.8 LBS

## 2019-12-18 DIAGNOSIS — S99.922D: Primary | ICD-10-CM

## 2019-12-18 PROCEDURE — 99213 OFFICE O/P EST LOW 20 MIN: CPT | Performed by: PODIATRIST

## 2019-12-18 RX ORDER — CEPHALEXIN 500 MG/1
500 CAPSULE ORAL EVERY 6 HOURS SCHEDULED
Qty: 28 CAPSULE | Refills: 0 | Status: SHIPPED | OUTPATIENT
Start: 2019-12-18 | End: 2019-12-25

## 2020-01-21 NOTE — PROGRESS NOTES
This patient was seen on 1/22/2020  Assessment:    Problem List Items Addressed This Visit        Other    History of open wound of lower extremity - Primary          Plan:  Wounds appear healed  No dressings required  Diagnoses and all orders for this visit:    History of open wound of lower extremity          Subjective:      Patient ID: Ximena Akers is a 38 y o  male  Pt arrives for wound care follow up  Presents wearing sneakers bilaterally  Pt denies problems and states all wounds are healed  The following portions of the patient's history were reviewed and updated as appropriate: allergies, current medications, past family history, past medical history, past social history, past surgical history and problem list     Review of Systems   Constitutional: Negative  Respiratory: Negative  Cardiovascular: Negative  Gastrointestinal: Negative  Musculoskeletal: Negative  Skin: Negative for wound  Neurological: Negative  Hematological: Negative  Psychiatric/Behavioral: Negative  Objective:      /100   Pulse 80   Ht 6' 3" (1 905 m) Comment: verbal  Wt (!) 139 kg (307 lb 6 4 oz)   BMI 38 42 kg/m²          Physical Exam   Constitutional: He is oriented to person, place, and time  He appears well-developed and well-nourished  No distress  HENT:   Head: Normocephalic  Eyes: Pupils are equal, round, and reactive to light  Pulmonary/Chest: Effort normal and breath sounds normal    Abdominal: Soft  Bowel sounds are normal    Musculoskeletal: Normal range of motion  Neurological: He is alert and oriented to person, place, and time  Skin: Skin is warm  Capillary refill takes less than 2 seconds  He is not diaphoretic  Psychiatric: He has a normal mood and affect  Nursing note and vitals reviewed          Wound # 1   Location: left medial foot HEALED  Length 0cm: Width 0cm: Depth 0cm:   Deepest Tissue Noted in Base:   Probe to Bone?: no  Peripheral Skin Description: intact  Granulation: 0% Fibrotic Tissue: 0% Necrotic Tissue: 0%  Drainage Amount:none  Signs of Infection: no  Total debrided 0 square centimeters

## 2020-01-22 ENCOUNTER — PROCEDURE VISIT (OUTPATIENT)
Dept: PODIATRY | Facility: CLINIC | Age: 45
End: 2020-01-22
Payer: COMMERCIAL

## 2020-01-22 VITALS
WEIGHT: 307.4 LBS | HEIGHT: 75 IN | SYSTOLIC BLOOD PRESSURE: 158 MMHG | DIASTOLIC BLOOD PRESSURE: 100 MMHG | BODY MASS INDEX: 38.22 KG/M2 | HEART RATE: 80 BPM

## 2020-01-22 DIAGNOSIS — Z87.828 HISTORY OF OPEN WOUND OF LOWER EXTREMITY: Primary | ICD-10-CM

## 2020-01-22 PROCEDURE — 99213 OFFICE O/P EST LOW 20 MIN: CPT | Performed by: PODIATRIST

## 2020-01-30 PROBLEM — Z87.828 HISTORY OF OPEN WOUND OF LOWER EXTREMITY: Status: ACTIVE | Noted: 2020-01-30

## 2020-01-30 PROBLEM — S99.922A: Status: RESOLVED | Noted: 2019-07-05 | Resolved: 2020-01-30

## 2020-01-30 PROBLEM — S99.921A: Status: RESOLVED | Noted: 2019-07-05 | Resolved: 2020-01-30

## 2021-03-30 DIAGNOSIS — Z23 ENCOUNTER FOR IMMUNIZATION: ICD-10-CM

## 2021-07-20 ENCOUNTER — OFFICE VISIT (OUTPATIENT)
Dept: BARIATRICS | Facility: CLINIC | Age: 46
End: 2021-07-20
Payer: COMMERCIAL

## 2021-07-20 VITALS
WEIGHT: 298.8 LBS | TEMPERATURE: 97.9 F | HEIGHT: 74 IN | BODY MASS INDEX: 38.35 KG/M2 | DIASTOLIC BLOOD PRESSURE: 68 MMHG | HEART RATE: 79 BPM | SYSTOLIC BLOOD PRESSURE: 118 MMHG

## 2021-07-20 DIAGNOSIS — E66.01 SEVERE OBESITY (BMI 35.0-39.9) WITH COMORBIDITY (HCC): Primary | ICD-10-CM

## 2021-07-20 PROCEDURE — 99244 OFF/OP CNSLTJ NEW/EST MOD 40: CPT | Performed by: PHYSICIAN ASSISTANT
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
## 2021-07-20 NOTE — PATIENT INSTRUCTIONS
Goals: Food log (ie ) www myfitnesspal com,sparkpeople  com,loseit com,calorieking  com,etc  baritastic  No sugary beverages  At least 64oz of water daily  Increase physical activity by 10 minutes daily   Gradually increase physical activity to a goal of 5 days per week for 30 minutes of MODERATE intensity PLUS 2 days per week of FULL BODY resistance training  5-10 servings of fruits and vegetables per day and 25-35 grams of dietary fiber per day, gradually increasing  Try smoothies for fruit and vegetable; Try putting veggie in  and incorporate in dishes/soups, blend cauli or eggplant into mashed potatoes; try lower calorie squashes  4066-9495 calories +150 calories on days of exercise  Measure all portions: creamers, sugars, cooking oils/butters, condiments, dressings, etc and log calories   If choosing starch pick whole grain/complex carbs and keep to 1/2-1 cup cup: oatmeal, brown rice, quinoa, whole wheat pasta, potatoes, sweet potato, chickpea noodles, red lentil noodles  Try halo top or enlighten ice cream  Check coverage of Saxenda, Qsymia, or Contrave

## 2021-07-21 PROBLEM — E66.01 SEVERE OBESITY (BMI 35.0-39.9) WITH COMORBIDITY (HCC): Status: ACTIVE | Noted: 2021-07-21

## 2021-07-21 NOTE — ASSESSMENT & PLAN NOTE
-Discussed options of [facility]-Intensive Lifestyle Intervention Program, Very Low Calorie Diet-VLCD and Conservative Program and the role of weight loss medications   -Initial weight loss goal of 5-10% weight loss for improved health  -Discussed option for bariatric surgery due to dx of THONG- pt not interested  -Screening labs: CMP, LP, TSH, vitamin D, testosterone reviewed from 7/19/21  -Patient is interested in pursuing Conservative Program   -Does not like fruits and vegetables and typically avoid them  He may try adding into smoothies  +cravings sweets/savory; discussed Topamax, but he is concerned about pressure in his eyes and is seeing Ophtha     Samples provided  Questions answered: fruits/vegetables, calorie density, protein goals, plant based proteins, calorie goals/calorie counting/calorie deficit, exercise,  Excess skin  Dietary recall reviewed  Nutrition recs provided       +hx of sleep apnea; Did not tolerate CPAP machine

## 2021-08-10 ENCOUNTER — OFFICE VISIT (OUTPATIENT)
Dept: PODIATRY | Facility: CLINIC | Age: 46
End: 2021-08-10
Payer: COMMERCIAL

## 2021-08-10 VITALS
SYSTOLIC BLOOD PRESSURE: 142 MMHG | WEIGHT: 299 LBS | DIASTOLIC BLOOD PRESSURE: 105 MMHG | BODY MASS INDEX: 37.98 KG/M2 | HEART RATE: 59 BPM

## 2021-08-10 DIAGNOSIS — Z87.828 HISTORY OF OPEN WOUND OF LOWER EXTREMITY: Primary | ICD-10-CM

## 2021-08-10 PROCEDURE — 99213 OFFICE O/P EST LOW 20 MIN: CPT | Performed by: PODIATRIST

## 2021-08-10 RX ORDER — AMMONIUM LACTATE 12 G/100G
CREAM TOPICAL
COMMUNITY
Start: 2021-07-28

## 2021-08-14 NOTE — PROGRESS NOTES
This patient was seen on 8/10/21  My role is Foot , Ankle, and Wound Specialist    SUBJECTIVE    Chief Complaint:  Discoloration of foot     Patient ID: Mc Holguin is a 41 y o  male  Jacinta Martinez is here as he's worried something is wrong with his feet as he notes some purple discoloration at his prior wound sites  He's known to me from complicated wounds of his feet that required inpatient operative debridements and grafts in the past  He's been healed for > 1year and has returned to work, ADL's, and sports (softball)  He denies pain at the sites nor any drainage  The following portions of the patient's history were reviewed and updated as appropriate: allergies, current medications, past family history, past medical history, past social history, past surgical history and problem list     Review of Systems   Constitutional: Negative  Respiratory: Negative  Cardiovascular: Negative  Gastrointestinal: Negative  Skin: Positive for color change  Negative for wound  OBJECTIVE      BP (!) 142/105   Pulse 59   Wt 136 kg (299 lb)   BMI 37 98 kg/m²     Foot/Ankle Musculoskeletal Exam    General      Neurological: alert    Neurovascular      Neurovascular - Right        Dorsalis pedis: 3+      Posterior tibial: 3+      Neurovascular - Left        Dorsalis pedis: 3+      Posterior tibial: 3+       Physical Exam  Vitals and nursing note reviewed  Constitutional:       General: He is not in acute distress  Appearance: Normal appearance  He is obese  He is not ill-appearing, toxic-appearing or diaphoretic  Cardiovascular:      Rate and Rhythm: Normal rate  Pulses:           Dorsalis pedis pulses are 3+ on the right side and 3+ on the left side  Posterior tibial pulses are 3+ on the right side and 3+ on the left side  Pulmonary:      Effort: Pulmonary effort is normal    Feet:      Right foot:      Protective Sensation: 10 sites tested  10 sites sensed        Left foot: Protective Sensation: 10 sites tested  10 sites sensed  Skin:     Capillary Refill: Capillary refill takes less than 2 seconds  Comments: I note some hyperpigmentation at his prior medial forefoot and medial heel (healed) wounds sites  No open wounds nor signs of infection  No pain on palpation  Neurological:      General: No focal deficit present  Mental Status: He is alert and oriented to person, place, and time  ASSESSMENT     Diagnoses and all orders for this visit:    History of open wound of lower extremity    Other orders  -     ammonium lactate (LAC-HYDRIN) 12 % cream; APPLY TWICE A DAY TO FEET         Problem List Items Addressed This Visit        Other    History of open wound of lower extremity - Primary              PLAN    I reassured him that it's normal to have some permanent pigmentation after healing full thickness wounds  This will cause him no harm  I recommended an emollient (lachydrin 12%) daily to the areas to keep them from getting hyperkeratotic

## 2022-09-29 NOTE — ADDENDUM NOTE
Addended by: Sterling Come on: 7/24/2019 04:47 PM     Modules accepted: Level of Service Melolabial Transposition Flap Text: The defect edges were debeveled with a #15 scalpel blade.  Given the location of the defect and the proximity to free margins a melolabial flap was deemed most appropriate.  Using a sterile surgical marker, an appropriate melolabial transposition flap was drawn incorporating the defect.    The area thus outlined was incised deep to adipose tissue with a #15 scalpel blade.  The skin margins were undermined to an appropriate distance in all directions utilizing iris scissors.

## 2025-07-03 ENCOUNTER — APPOINTMENT (EMERGENCY)
Dept: VASCULAR ULTRASOUND | Facility: HOSPITAL | Age: 50
End: 2025-07-03
Payer: COMMERCIAL

## 2025-07-03 ENCOUNTER — HOSPITAL ENCOUNTER (EMERGENCY)
Facility: HOSPITAL | Age: 50
Discharge: HOME | End: 2025-07-03
Attending: EMERGENCY MEDICINE
Payer: COMMERCIAL

## 2025-07-03 VITALS
RESPIRATION RATE: 16 BRPM | SYSTOLIC BLOOD PRESSURE: 166 MMHG | TEMPERATURE: 98.4 F | OXYGEN SATURATION: 100 % | DIASTOLIC BLOOD PRESSURE: 91 MMHG | HEART RATE: 74 BPM

## 2025-07-03 DIAGNOSIS — M79.605 LEFT LEG PAIN: Primary | ICD-10-CM

## 2025-07-03 DIAGNOSIS — T14.8XXA MUSCLE STRAIN: ICD-10-CM

## 2025-07-03 PROCEDURE — 99284 EMERGENCY DEPT VISIT MOD MDM: CPT | Performed by: EMERGENCY MEDICINE

## 2025-07-03 PROCEDURE — 99283 EMERGENCY DEPT VISIT LOW MDM: CPT

## 2025-07-03 PROCEDURE — 93971 EXTREMITY STUDY: CPT

## 2025-07-03 RX ORDER — NAPROXEN 500 MG/1
500 TABLET ORAL 2 TIMES DAILY WITH MEALS
Qty: 30 TABLET | Refills: 0 | Status: SHIPPED | OUTPATIENT
Start: 2025-07-03

## 2025-07-03 RX ORDER — NAPROXEN 250 MG/1
500 TABLET ORAL ONCE
Status: COMPLETED | OUTPATIENT
Start: 2025-07-03 | End: 2025-07-03

## 2025-07-03 RX ADMIN — NAPROXEN 500 MG: 250 TABLET ORAL at 20:11

## 2025-07-03 NOTE — DISCHARGE INSTRUCTIONS
You may take 650mg of Tylenol every four to six hours, not exceeding 3,000mg daily, for the management of your discomfort.

## 2025-07-04 PROCEDURE — 93971 EXTREMITY STUDY: CPT | Performed by: SURGERY
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
## 2025-07-07 ENCOUNTER — OFFICE VISIT (OUTPATIENT)
Dept: OBGYN CLINIC | Facility: CLINIC | Age: 50
End: 2025-07-07
Payer: COMMERCIAL

## 2025-07-07 ENCOUNTER — APPOINTMENT (OUTPATIENT)
Dept: RADIOLOGY | Facility: AMBULARY SURGERY CENTER | Age: 50
End: 2025-07-07
Attending: ORTHOPAEDIC SURGERY
Payer: COMMERCIAL

## 2025-07-07 VITALS — HEIGHT: 74 IN | WEIGHT: 282 LBS | BODY MASS INDEX: 36.19 KG/M2

## 2025-07-07 DIAGNOSIS — S76.312A STRAIN OF LEFT HAMSTRING MUSCLE, INITIAL ENCOUNTER: ICD-10-CM

## 2025-07-07 DIAGNOSIS — M62.89 TIGHTNESS OF FASCIA OF LOWER EXTREMITY: Primary | ICD-10-CM

## 2025-07-07 DIAGNOSIS — M25.562 LEFT KNEE PAIN, UNSPECIFIED CHRONICITY: ICD-10-CM

## 2025-07-07 PROCEDURE — 73562 X-RAY EXAM OF KNEE 3: CPT

## 2025-07-07 PROCEDURE — 99204 OFFICE O/P NEW MOD 45 MIN: CPT | Performed by: ORTHOPAEDIC SURGERY

## 2025-07-07 RX ORDER — FLUTICASONE PROPIONATE 50 MCG
2 SPRAY, SUSPENSION (ML) NASAL DAILY
COMMUNITY
Start: 2025-05-27

## 2025-07-07 NOTE — ASSESSMENT & PLAN NOTE
Physical exam, diagnostic imaging, and subjective history are all most consistent with the above diagnosis. The pathoanatomy and natural history of this diagnosis was explained to the patient in detail.   X-ray obtained today and reviewed with the patient demonstrating no acute osseous abnormalities, mild medial compartment osteoarthritis  Referral placed today for Guille to initiate outpatient PT to include stretching, knee stability, proprioception  Recommend initiating care with [facility] for [facility] back pain  Follow up as needed for re-evaluation    Orders:    Ambulatory Referral to Orthopedic Surgery    XR knee 3 vw left non injury; Future    Ambulatory referral to Physical Therapy; Future
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
## 2025-07-08 ENCOUNTER — EVALUATION (OUTPATIENT)
Dept: PHYSICAL THERAPY | Facility: CLINIC | Age: 50
End: 2025-07-08
Attending: ORTHOPAEDIC SURGERY
Payer: COMMERCIAL

## 2025-07-08 DIAGNOSIS — S76.312A STRAIN OF LEFT HAMSTRING MUSCLE, INITIAL ENCOUNTER: ICD-10-CM

## 2025-07-08 DIAGNOSIS — M62.89 TIGHTNESS OF FASCIA OF LOWER EXTREMITY: Primary | ICD-10-CM

## 2025-07-08 PROCEDURE — 97110 THERAPEUTIC EXERCISES: CPT

## 2025-07-08 PROCEDURE — 97112 NEUROMUSCULAR REEDUCATION: CPT

## 2025-07-08 PROCEDURE — 97161 PT EVAL LOW COMPLEX 20 MIN: CPT

## 2025-07-08 NOTE — PROGRESS NOTES
PT Evaluation     Today's date: 2025  Patient name: Guille Paredes  : 1975  MRN: [id number]  Referring provider: Kole Kwon DO  Dx:   Encounter Diagnosis     ICD-10-CM    1. Tightness of fascia of lower extremity  M62.89 Ambulatory referral to Physical Therapy      2. Strain of left hamstring muscle, initial encounter  S76.312A Ambulatory referral to Physical Therapy                     Assessment    Assessment details:     Impairment/Problem List:  1. L HS pain  2. L HS weakness  3. LE global hypomobility     Guille Paredes is a 49 y.o. male who presents with L HS pain. Their clinical presentation aligns with HS strain. Guille Paredes is limited with all ADLs and recreational activities secondary to the impairments stated above. No further referral appears necessary at this time based upon examination results. Prognosis is good given POC/HEP compliance. Will follow up with PT only as needed as pt chooses to perform HEP exercises as part of already established regular gym routine.       Understanding of Dx/Px/POC: good     Prognosis: good    Goals  Short Term Goals:  Pt will report decreased levels of pain by at least 2 subjective ratings in 4 weeks  Pt will demonstrate improved ROM by at least 10 degrees in 4 weeks  Pt will demonstrate improved strength by ½ grade in 4 weeks  Pt will be able to jog 90 feet at 50% speed without pain in 4 weeks    Long Term Goals:  Pt will be independent with HEP in 8 weeks  Pt will be functional with all ADL's in 8 weeks  Pt will achieve their predicted FOTO score in 8 weeks  Pt will be able to play a recreational softball game without pain or limitation in 8 weeks    Plan  Patient would benefit from: skilled physical therapy  Referral necessary: No  Planned modality interventions: low level laser therapy    Planned therapy interventions: abdominal trunk stabilization, IASTM, joint mobilization, manual therapy, nerve gliding, neuromuscular re-education, balance,  balance/weight bearing training, body mechanics training, breathing training, patient education, postural training, strengthening, stretching, therapeutic activities, therapeutic exercise, functional ROM exercises, gait training, graded activity, graded exercise and home exercise program    Frequency: prn.  Duration in weeks: 12  Plan of Care beginning date: 7/8/2025  Plan of Care expiration date: 9/30/2025  Treatment plan discussed with: patient      Subjective Evaluation    History of Present Illness  Mechanism of injury: Pt reports he felt a strain in his HS muscle when playing a rec softball game. Then had bruising and swelling in HS a few weeks later with continued pain and weakness. Ortho believes it to be HS strain. 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  Onset Gradual/Immediate: immediate  Timeframe: 5-8 weeks  Pain Location/Descriptors: L HS, entire length of muscle  Constant/Intermittent: constant  Multiple pain locations related Y/N: n/a  Symptoms changed since onset Y/N: n/a  Symptoms unchanged/improved/worsening since onset: slightly improved  Aggravating: use, sport, activity  Easing: gentle activity  AM/PM Pattern: none   Tissue Irritability Mild/Moderate/Severe: mild to moderate    Denies 5d's, 3n's. Pt denies unrelenting night pain, unexplained weight changes, bowel/bladder symptoms, saddle region numbness, ataxia.    Patient Goals  Patient goals for therapy: decreased pain, increased motion, independence with ADLs/IADLs, increased strength and return to sport/leisure activities        Objective     Observations     Additional Observation Details  Old bruising of L HS in body of the muslce    Tenderness     Additional Tenderness Details  No tenderness to palpation    Lumbar Screen  Lumbar range of motion within normal limits with the following exceptions:Ext AROM moderately limited    Neurological Testing     Additional Neurological Details  LE dermatomes and  "myotomes symmetrical and WNL    Passive Range of Motion   Left Knee   Flexion: 120 degrees   Prone flexion: 110 degrees   Extension: WFL    Right Knee   Flexion: 130 degrees   Prone flexion: 120 degrees   Extension: WFL    Additional Passive Range of Motion Details  Hip ROM WNL with R hip being slightly limited compared to L with flexion and ER PROM    Strength/Myotome Testing     Left Hip   Planes of Motion   Flexion: 5  Extension: 4+  Abduction: 4+  Adduction: 5  External rotation: 4+  Internal rotation: 5    Right Hip   Normal muscle strength    Left Knee   Flexion: 4-  Prone flexion: 3+  Extension: 5  Quadriceps contraction: good    Right Knee   Normal strength    Additional Strength Details  HS MMT painful    Tests     Left Hip   90/90 SLR: Positive.              Precautions: None      Manuals 7/8                                                                Neuro Re-Ed             HS set 20x2\"            Supine heel slide AROM 1x20            Prone knee flexion AROM 3x10            Standing knee flexion AROM 3x10            HS curl machine 3x6-8            Prone HE curl machine at gym 3x6-8            Leg press             RDL             Ther Ex             Global LE stretching edu 15'            Progressive overload strengthening edu 15'                                                                                          Ther Activity                                       Gait Training                                       Modalities                                            "

## 2025-07-08 NOTE — HOME EXERCISE EDUCATION
Program_ID:[id number]   Access Code: [email or web address]  URL: [email or web address]  Date: 07-  Prepared By: Kate Irizarry    Program Notes      Exercises      - Seated Self Great Toe Stretch - 1-2 x daily - 7 x weekly - 1 sets - 10 reps - 15 seconds hold      - Gastroc Stretch on Wall - 1-2 x daily - 7 x weekly - 1 sets - 3 reps - 15 seconds hold      - Standing Gastroc Stretch on Step - 1-2 x daily - 7 x weekly - 1 sets - 3 reps - 15 seconds hold      - Supine Hamstring Stretch with Strap - 1-2 x daily - 7 x weekly - 1 sets - 3 reps - 15 seconds hold      - Standing Hamstring Stretch with Step - 1-2 x daily - 7 x weekly - 1 sets - 3 reps - 15 seconds hold      - Standing Quadriceps Stretch - 1-2 x daily - 7 x weekly - 1 sets - 3 reps - 15 seconds hold      - Seated Hip External Rotation Stretch - 1-2 x daily - 7 x weekly - 1 sets - 3 reps - 15 seconds hold      - Adductor Stretch on Chair - 1-2 x daily - 7 x weekly - 1 sets - 3 reps - 15 seconds hold      - Standing Hip Flexor Stretch - 1-2 x daily - 7 x weekly - 1 sets - 3 reps - 15 seconds hold      - Supine Isometric Hamstring Set - 1 x daily - 7 x weekly - 1 sets - 20 reps      - Supine Heel Slide - 1 x daily - 7 x weekly - 2 sets - 20 reps      - Standing Knee Flexion AROM with Chair Support - 1 x daily - 7 x weekly - 3 sets - 10 reps      - Prone Knee Flexion - 1 x daily - 7 x weekly - 3 sets - 10 reps      - Hamstring Curl with Weight Machine - 1 x daily - 7 x weekly - 3 sets - 6-8 reps      - Standing Lumbar Extension - 1-2 x daily - 7 x weekly - 1 sets - 10 reps      - Prone Press Up - 1-2 x daily - 7 x weekly - 1 sets - 10 reps

## (undated) DEVICE — STANDARD SURGICAL GOWN, L: Brand: CONVERTORS

## (undated) DEVICE — GLOVE SRG BIOGEL 6

## (undated) DEVICE — NEEDLE 25G X 1 1/2

## (undated) DEVICE — GLOVE SRG BIOGEL ECLIPSE 7.5

## (undated) DEVICE — KERLIX BANDAGE ROLL: Brand: KERLIX

## (undated) DEVICE — WET SKIN PREP TRAY: Brand: MEDLINE INDUSTRIES, INC.

## (undated) DEVICE — INTENDED FOR TISSUE SEPARATION, AND OTHER PROCEDURES THAT REQUIRE A SHARP SURGICAL BLADE TO PUNCTURE OR CUT.: Brand: BARD-PARKER ® CARBON RIB-BACK BLADES

## (undated) DEVICE — SURGIFOAM 8.5 X 12.5

## (undated) DEVICE — SYRINGE 10ML LL

## (undated) DEVICE — PROXIMATE PLUS MD MULTI-DIRECTIONAL RELEASE SKIN STAPLERS CONTAINS 35 STAINLESS STEEL STAPLES APPROXIMATE CLOSED DIMENSIONS: 6.9MM X 3.9MM WIDE: Brand: PROXIMATE

## (undated) DEVICE — SUT VICRYL 4-0 SH 27 IN J415H

## (undated) DEVICE — CURITY NON-ADHERENT STRIPS: Brand: CURITY

## (undated) DEVICE — TUBING SUCTION 5MM X 12 FT

## (undated) DEVICE — GAUZE SPONGES,16 PLY: Brand: CURITY

## (undated) DEVICE — OCCLUSIVE GAUZE STRIP,3% BISMUTH TRIBROMOPHENATE IN PETROLATUM BLEND: Brand: XEROFORM

## (undated) DEVICE — NEEDLE 18 G X 1 1/2

## (undated) DEVICE — PADDING CAST 4 IN  COTTON STRL

## (undated) DEVICE — GLOVE SRG BIOGEL 7.5

## (undated) DEVICE — GLOVE INDICATOR PI UNDERGLOVE SZ 7.5 BLUE

## (undated) DEVICE — BETHLEHEM UNIVERSAL  MIONR EXT: Brand: CARDINAL HEALTH

## (undated) DEVICE — CAST PADDING 4 IN SYNTHETIC NON-STRL

## (undated) DEVICE — 2000CC GUARDIAN II: Brand: GUARDIAN

## (undated) DEVICE — PROXIMATE SKIN STAPLE EXTRACTORS: Brand: PROXIMATE

## (undated) DEVICE — ACE WRAP 4 IN UNSTERILE

## (undated) DEVICE — SPONGE LAP 18 X 18 IN STRL RFD

## (undated) DEVICE — PENCIL ELECTROSURG E-Z CLEAN -0035H

## (undated) DEVICE — THE SIMPULSE SOLO SYSTEM WITH ULTREX RETRACTABLE SPLASH SHIELD TIP: Brand: SIMPULSE SOLO

## (undated) DEVICE — 3M™ TEGADERM™ TRANSPARENT FILM DRESSING FRAME STYLE, 1626W, 4 IN X 4-3/4 IN (10 CM X 12 CM), 50/CT 4CT/CASE: Brand: 3M™ TEGADERM™

## (undated) DEVICE — INTENDED FOR TISSUE SEPARATION, AND OTHER PROCEDURES THAT REQUIRE A SHARP SURGICAL BLADE TO PUNCTURE OR CUT.: Brand: BARD-PARKER SAFETY BLADES SIZE 15, STERILE

## (undated) DEVICE — GLOVE INDICATOR UNDERGLOVE SZ 6 BLUE

## (undated) DEVICE — SUT VICRYL 2-0 REEL 54 IN J286G

## (undated) DEVICE — SUT ETHILON 3-0 PS-1 18 IN 1663G

## (undated) DEVICE — TELFA NON-ADHERENT ABSORBENT DRESSING: Brand: TELFA

## (undated) DEVICE — SUT VICRYL 3-0 REEL 54 IN J285G

## (undated) DEVICE — 10FR FRAZIER SUCTION HANDLE: Brand: CARDINAL HEALTH

## (undated) DEVICE — UNIVERSAL MAJOR EXTREMITY,KIT: Brand: CARDINAL HEALTH

## (undated) DEVICE — STOCKINETTE REGULAR

## (undated) DEVICE — SUT VICRYL 2-0 SH 27 IN UNDYED J417H

## (undated) DEVICE — SUT VICRYL 3-0 SH 27 IN J416H

## (undated) DEVICE — PROXIMATE SKIN STAPLERS (35 WIDE) CONTAINS 35 STAINLESS STEEL STAPLES (FIXED HEAD): Brand: PROXIMATE